# Patient Record
Sex: MALE | Race: WHITE | NOT HISPANIC OR LATINO | Employment: OTHER | URBAN - METROPOLITAN AREA
[De-identification: names, ages, dates, MRNs, and addresses within clinical notes are randomized per-mention and may not be internally consistent; named-entity substitution may affect disease eponyms.]

---

## 2019-09-13 ENCOUNTER — ANCILLARY PROCEDURE (OUTPATIENT)
Dept: ULTRASOUND IMAGING | Facility: CLINIC | Age: 69
End: 2019-09-13
Payer: MEDICARE

## 2019-09-13 DIAGNOSIS — E78.5 HYPERLIPIDEMIA, UNSPECIFIED: ICD-10-CM

## 2021-09-13 ENCOUNTER — LAB REQUISITION (OUTPATIENT)
Dept: LAB | Facility: CLINIC | Age: 71
End: 2021-09-13
Payer: MEDICARE

## 2021-09-13 DIAGNOSIS — Z00.00 ENCOUNTER FOR GENERAL ADULT MEDICAL EXAMINATION WITHOUT ABNORMAL FINDINGS: ICD-10-CM

## 2021-09-13 DIAGNOSIS — Z12.5 ENCOUNTER FOR SCREENING FOR MALIGNANT NEOPLASM OF PROSTATE: ICD-10-CM

## 2021-09-13 LAB
ALBUMIN SERPL-MCNC: 4 G/DL (ref 3.4–5)
ALP SERPL-CCNC: 47 U/L (ref 40–150)
ALT SERPL W P-5'-P-CCNC: 38 U/L (ref 0–70)
ANION GAP SERPL CALCULATED.3IONS-SCNC: 4 MMOL/L (ref 3–14)
AST SERPL W P-5'-P-CCNC: 33 U/L (ref 0–45)
BILIRUB SERPL-MCNC: 0.4 MG/DL (ref 0.2–1.3)
BUN SERPL-MCNC: 22 MG/DL (ref 7–30)
CALCIUM SERPL-MCNC: 9.2 MG/DL (ref 8.5–10.1)
CHLORIDE BLD-SCNC: 110 MMOL/L (ref 94–109)
CHOLEST SERPL-MCNC: 158 MG/DL
CO2 SERPL-SCNC: 28 MMOL/L (ref 20–32)
CREAT SERPL-MCNC: 1.36 MG/DL (ref 0.66–1.25)
FASTING STATUS PATIENT QL REPORTED: YES
GFR SERPL CREATININE-BSD FRML MDRD: 52 ML/MIN/1.73M2
GLUCOSE BLD-MCNC: 80 MG/DL (ref 70–99)
HDLC SERPL-MCNC: 56 MG/DL
LDLC SERPL CALC-MCNC: 82 MG/DL
NONHDLC SERPL-MCNC: 102 MG/DL
POTASSIUM BLD-SCNC: 4.2 MMOL/L (ref 3.4–5.3)
PROT SERPL-MCNC: 7.5 G/DL (ref 6.8–8.8)
PSA SERPL-MCNC: 0.07 UG/L (ref 0–4)
SODIUM SERPL-SCNC: 142 MMOL/L (ref 133–144)
TRIGL SERPL-MCNC: 102 MG/DL

## 2021-09-13 PROCEDURE — 80061 LIPID PANEL: CPT | Mod: ORL | Performed by: INTERNAL MEDICINE

## 2021-09-13 PROCEDURE — 80053 COMPREHEN METABOLIC PANEL: CPT | Mod: ORL | Performed by: INTERNAL MEDICINE

## 2021-09-13 PROCEDURE — G0103 PSA SCREENING: HCPCS | Mod: ORL | Performed by: INTERNAL MEDICINE

## 2022-02-28 ENCOUNTER — LAB REQUISITION (OUTPATIENT)
Dept: LAB | Facility: CLINIC | Age: 72
End: 2022-02-28
Payer: MEDICARE

## 2022-02-28 DIAGNOSIS — R79.89 OTHER SPECIFIED ABNORMAL FINDINGS OF BLOOD CHEMISTRY: ICD-10-CM

## 2022-02-28 LAB
ANION GAP SERPL CALCULATED.3IONS-SCNC: 5 MMOL/L (ref 3–14)
BUN SERPL-MCNC: 27 MG/DL (ref 7–30)
CALCIUM SERPL-MCNC: 9 MG/DL (ref 8.5–10.1)
CHLORIDE BLD-SCNC: 104 MMOL/L (ref 94–109)
CO2 SERPL-SCNC: 27 MMOL/L (ref 20–32)
CREAT SERPL-MCNC: 1.3 MG/DL (ref 0.66–1.25)
GFR SERPL CREATININE-BSD FRML MDRD: 58 ML/MIN/1.73M2
GLUCOSE BLD-MCNC: 77 MG/DL (ref 70–99)
POTASSIUM BLD-SCNC: 4.5 MMOL/L (ref 3.4–5.3)
SODIUM SERPL-SCNC: 136 MMOL/L (ref 133–144)

## 2022-02-28 PROCEDURE — 80048 BASIC METABOLIC PNL TOTAL CA: CPT | Mod: ORL | Performed by: INTERNAL MEDICINE

## 2022-07-25 ENCOUNTER — LAB REQUISITION (OUTPATIENT)
Dept: LAB | Facility: CLINIC | Age: 72
End: 2022-07-25
Payer: MEDICARE

## 2022-07-25 DIAGNOSIS — R79.89 OTHER SPECIFIED ABNORMAL FINDINGS OF BLOOD CHEMISTRY: ICD-10-CM

## 2022-07-25 DIAGNOSIS — Z12.5 ENCOUNTER FOR SCREENING FOR MALIGNANT NEOPLASM OF PROSTATE: ICD-10-CM

## 2022-07-25 DIAGNOSIS — Z13.1 ENCOUNTER FOR SCREENING FOR DIABETES MELLITUS: ICD-10-CM

## 2022-07-25 DIAGNOSIS — M81.0 AGE-RELATED OSTEOPOROSIS WITHOUT CURRENT PATHOLOGICAL FRACTURE: ICD-10-CM

## 2022-07-25 LAB
ANION GAP SERPL CALCULATED.3IONS-SCNC: 14 MMOL/L (ref 7–15)
BUN SERPL-MCNC: 24.9 MG/DL (ref 8–23)
CALCIUM SERPL-MCNC: 9.3 MG/DL (ref 8.8–10.2)
CHLORIDE SERPL-SCNC: 104 MMOL/L (ref 98–107)
CREAT SERPL-MCNC: 1.34 MG/DL (ref 0.67–1.17)
DEPRECATED HCO3 PLAS-SCNC: 22 MMOL/L (ref 22–29)
GFR SERPL CREATININE-BSD FRML MDRD: 56 ML/MIN/1.73M2
GLUCOSE SERPL-MCNC: 82 MG/DL (ref 70–99)
HBA1C MFR BLD: 6.1 %
POTASSIUM SERPL-SCNC: 4.1 MMOL/L (ref 3.4–5.3)
PSA SERPL-MCNC: 0.07 NG/ML (ref 0–6.5)
PTH-INTACT SERPL-MCNC: 32 PG/ML (ref 15–65)
SODIUM SERPL-SCNC: 140 MMOL/L (ref 136–145)

## 2022-07-25 PROCEDURE — 83036 HEMOGLOBIN GLYCOSYLATED A1C: CPT | Mod: ORL | Performed by: INTERNAL MEDICINE

## 2022-07-25 PROCEDURE — G0103 PSA SCREENING: HCPCS | Mod: ORL | Performed by: INTERNAL MEDICINE

## 2022-07-25 PROCEDURE — 82306 VITAMIN D 25 HYDROXY: CPT | Mod: ORL | Performed by: INTERNAL MEDICINE

## 2022-07-25 PROCEDURE — 84403 ASSAY OF TOTAL TESTOSTERONE: CPT | Mod: ORL | Performed by: INTERNAL MEDICINE

## 2022-07-25 PROCEDURE — 80048 BASIC METABOLIC PNL TOTAL CA: CPT | Mod: ORL | Performed by: INTERNAL MEDICINE

## 2022-07-25 PROCEDURE — 84270 ASSAY OF SEX HORMONE GLOBUL: CPT | Mod: ORL | Performed by: INTERNAL MEDICINE

## 2022-07-25 PROCEDURE — 83970 ASSAY OF PARATHORMONE: CPT | Mod: ORL | Performed by: INTERNAL MEDICINE

## 2022-07-26 LAB — SHBG SERPL-SCNC: 92 NMOL/L (ref 11–80)

## 2022-07-27 LAB
TESTOST FREE SERPL-MCNC: 7.13 NG/DL
TESTOST SERPL-MCNC: 693 NG/DL (ref 240–950)

## 2022-07-28 LAB
DEPRECATED CALCIDIOL+CALCIFEROL SERPL-MC: <44 UG/L (ref 20–75)
VITAMIN D2 SERPL-MCNC: <5 UG/L
VITAMIN D3 SERPL-MCNC: 39 UG/L

## 2022-09-26 ENCOUNTER — LAB REQUISITION (OUTPATIENT)
Dept: LAB | Facility: CLINIC | Age: 72
End: 2022-09-26
Payer: MEDICARE

## 2022-09-26 DIAGNOSIS — R73.03 PREDIABETES: ICD-10-CM

## 2022-09-26 LAB
ANION GAP SERPL CALCULATED.3IONS-SCNC: 15 MMOL/L (ref 7–15)
BUN SERPL-MCNC: 28.5 MG/DL (ref 8–23)
CALCIUM SERPL-MCNC: 9 MG/DL (ref 8.8–10.2)
CHLORIDE SERPL-SCNC: 106 MMOL/L (ref 98–107)
CREAT SERPL-MCNC: 1.47 MG/DL (ref 0.67–1.17)
DEPRECATED HCO3 PLAS-SCNC: 20 MMOL/L (ref 22–29)
GFR SERPL CREATININE-BSD FRML MDRD: 50 ML/MIN/1.73M2
GLUCOSE SERPL-MCNC: 83 MG/DL (ref 70–99)
POTASSIUM SERPL-SCNC: 4 MMOL/L (ref 3.4–5.3)
SODIUM SERPL-SCNC: 141 MMOL/L (ref 136–145)

## 2022-09-26 PROCEDURE — 80048 BASIC METABOLIC PNL TOTAL CA: CPT | Mod: ORL | Performed by: INTERNAL MEDICINE

## 2022-10-03 ENCOUNTER — MEDICAL CORRESPONDENCE (OUTPATIENT)
Dept: HEALTH INFORMATION MANAGEMENT | Facility: CLINIC | Age: 72
End: 2022-10-03

## 2022-10-06 ENCOUNTER — TELEPHONE (OUTPATIENT)
Dept: NEPHROLOGY | Facility: CLINIC | Age: 72
End: 2022-10-06

## 2022-10-06 DIAGNOSIS — E55.9 VITAMIN D DEFICIENCY, UNSPECIFIED: ICD-10-CM

## 2022-10-06 DIAGNOSIS — R79.89 ELEVATED SERUM CREATININE: Primary | ICD-10-CM

## 2022-10-06 NOTE — TELEPHONE ENCOUNTER
[facility] Call Center    Phone Message    May a detailed message be left on voicemail: yes     Reason for Call: Order(s): Other:   Reason for requested: Labs  Date needed: 10/31/22  Provider name: Dr. Powell      Action Taken: Message routed to:  [facility] ([facility] Neph    Travel Screening: Not Applicable

## 2022-10-07 ENCOUNTER — TRANSCRIBE ORDERS (OUTPATIENT)
Dept: OTHER | Age: 72
End: 2022-10-07

## 2022-10-07 DIAGNOSIS — R79.89 ELEVATED SERUM CREATININE: Primary | ICD-10-CM

## 2022-10-07 NOTE — TELEPHONE ENCOUNTER
DIAGNOSIS:   elevated creatinine   DATE RECEIVED: 10.31.2022   NOTES STATUS DETAILS   OFFICE NOTE from referring provider     OFFICE NOTE from other specialist  Care Everywhere 07.25.2022 Ray Holt MD  [facility]     *Only VASCULITIS or LUPUS gather office notes for the following     *PULMONARY       *ENT     *DERMATOLOGY     *RHEUMATOLOGY     DISCHARGE SUMMARY from hospital     DISCHARGE REPORT from the ER     MEDICATION LIST     IMAGING  (NEED IMAGES AND REPORTS)     KIDNEY CT SCAN     KIDNEY ULTRASOUND     MR ABDOMEN     NUCLEAR MEDICINE RENAL     LABS     CBC Care Everywhere 09.26.2022   CMP Care Everywhere 09.26.2022   BMP Care Everywhere 09.26.2022   UA     URINE PROTEIN     RENAL PANEL     BIOPSY     KIDNEY BIOPSY

## 2022-10-31 ENCOUNTER — LAB (OUTPATIENT)
Dept: LAB | Facility: CLINIC | Age: 72
End: 2022-10-31
Payer: MEDICARE

## 2022-10-31 ENCOUNTER — PRE VISIT (OUTPATIENT)
Dept: NEPHROLOGY | Facility: CLINIC | Age: 72
End: 2022-10-31

## 2022-10-31 ENCOUNTER — OFFICE VISIT (OUTPATIENT)
Dept: NEPHROLOGY | Facility: CLINIC | Age: 72
End: 2022-10-31
Attending: INTERNAL MEDICINE
Payer: MEDICARE

## 2022-10-31 VITALS
WEIGHT: 145.1 LBS | DIASTOLIC BLOOD PRESSURE: 72 MMHG | SYSTOLIC BLOOD PRESSURE: 118 MMHG | HEART RATE: 48 BPM | OXYGEN SATURATION: 100 % | TEMPERATURE: 97.3 F

## 2022-10-31 DIAGNOSIS — R79.89 ELEVATED SERUM CREATININE: ICD-10-CM

## 2022-10-31 DIAGNOSIS — E55.9 VITAMIN D DEFICIENCY, UNSPECIFIED: ICD-10-CM

## 2022-10-31 DIAGNOSIS — N28.1 KIDNEY CYSTS: Primary | ICD-10-CM

## 2022-10-31 LAB
ALBUMIN MFR UR ELPH: 7.2 MG/DL
ALBUMIN SERPL BCG-MCNC: 4.3 G/DL (ref 3.5–5.2)
ALBUMIN UR-MCNC: NEGATIVE MG/DL
ANION GAP SERPL CALCULATED.3IONS-SCNC: 10 MMOL/L (ref 7–15)
APPEARANCE UR: CLEAR
BILIRUB UR QL STRIP: NEGATIVE
BUN SERPL-MCNC: 28.7 MG/DL (ref 8–23)
CALCIUM SERPL-MCNC: 9.4 MG/DL (ref 8.8–10.2)
CHLORIDE SERPL-SCNC: 107 MMOL/L (ref 98–107)
COLOR UR AUTO: YELLOW
CREAT SERPL-MCNC: 1.56 MG/DL (ref 0.67–1.17)
CREAT UR-MCNC: 73.4 MG/DL
CYSTATIN C (ROCHE): 0.9 MG/L (ref 0.6–1)
DEPRECATED CALCIDIOL+CALCIFEROL SERPL-MC: 32 UG/L (ref 20–75)
DEPRECATED HCO3 PLAS-SCNC: 24 MMOL/L (ref 22–29)
ERYTHROCYTE [DISTWIDTH] IN BLOOD BY AUTOMATED COUNT: 13.2 % (ref 10–15)
GFR SERPL CREATININE-BSD FRML MDRD: 47 ML/MIN/1.73M2
GFR SERPL CREATININE-BSD FRML MDRD: 85 ML/MIN/1.73M2
GLUCOSE SERPL-MCNC: 91 MG/DL (ref 70–99)
GLUCOSE UR STRIP-MCNC: NEGATIVE MG/DL
HCT VFR BLD AUTO: 41.6 % (ref 40–53)
HGB BLD-MCNC: 14.1 G/DL (ref 13.3–17.7)
HGB UR QL STRIP: NEGATIVE
KETONES UR STRIP-MCNC: NEGATIVE MG/DL
LEUKOCYTE ESTERASE UR QL STRIP: NEGATIVE
MCH RBC QN AUTO: 32 PG (ref 26.5–33)
MCHC RBC AUTO-ENTMCNC: 33.9 G/DL (ref 31.5–36.5)
MCV RBC AUTO: 95 FL (ref 78–100)
NITRATE UR QL: NEGATIVE
PH UR STRIP: 6.5 [PH] (ref 5–7)
PHOSPHATE SERPL-MCNC: 2.9 MG/DL (ref 2.5–4.5)
PLATELET # BLD AUTO: 145 10E3/UL (ref 150–450)
POTASSIUM SERPL-SCNC: 4 MMOL/L (ref 3.4–5.3)
PROT/CREAT 24H UR: 0.1 MG/MG CR (ref 0–0.2)
PTH-INTACT SERPL-MCNC: 39 PG/ML (ref 15–65)
RBC # BLD AUTO: 4.4 10E6/UL (ref 4.4–5.9)
RBC URINE: <1 /HPF
SODIUM SERPL-SCNC: 141 MMOL/L (ref 136–145)
SP GR UR STRIP: 1.01 (ref 1–1.03)
UROBILINOGEN UR STRIP-MCNC: NORMAL MG/DL
WBC # BLD AUTO: 2.9 10E3/UL (ref 4–11)
WBC URINE: 0 /HPF

## 2022-10-31 PROCEDURE — 36415 COLL VENOUS BLD VENIPUNCTURE: CPT | Performed by: PATHOLOGY

## 2022-10-31 PROCEDURE — 81001 URINALYSIS AUTO W/SCOPE: CPT | Performed by: PATHOLOGY

## 2022-10-31 PROCEDURE — 80069 RENAL FUNCTION PANEL: CPT | Performed by: PATHOLOGY

## 2022-10-31 PROCEDURE — 85027 COMPLETE CBC AUTOMATED: CPT | Performed by: PATHOLOGY

## 2022-10-31 PROCEDURE — 83970 ASSAY OF PARATHORMONE: CPT | Performed by: PATHOLOGY

## 2022-10-31 PROCEDURE — G0463 HOSPITAL OUTPT CLINIC VISIT: HCPCS

## 2022-10-31 PROCEDURE — 82306 VITAMIN D 25 HYDROXY: CPT | Performed by: INTERNAL MEDICINE

## 2022-10-31 PROCEDURE — 99417 PROLNG OP E/M EACH 15 MIN: CPT | Performed by: INTERNAL MEDICINE

## 2022-10-31 PROCEDURE — 82610 CYSTATIN C: CPT | Performed by: INTERNAL MEDICINE

## 2022-10-31 PROCEDURE — 99205 OFFICE O/P NEW HI 60 MIN: CPT | Performed by: INTERNAL MEDICINE

## 2022-10-31 PROCEDURE — 84156 ASSAY OF PROTEIN URINE: CPT | Performed by: INTERNAL MEDICINE

## 2022-10-31 ASSESSMENT — PAIN SCALES - GENERAL: PAINLEVEL: NO PAIN (0)

## 2022-10-31 NOTE — NURSING NOTE
Chief Complaint   Patient presents with     New Patient       /72   Pulse (!) 48   Temp 97.3  F (36.3  C) (Oral)   Wt 65.8 kg (145 lb 1.6 oz)   SpO2 100%     Chaitanya Gambino on 10/31/2022 at 8:43 AM

## 2022-10-31 NOTE — PROGRESS NOTES
"  Nephrology Initial Consult  October 30, 2022      Phu Jackson MRN:[id number] YOB: 1950  Primary care provider: Ray Holt  Requesting physician: Self-referred    REASON FOR CONSULT: Concerned about kidney health, elevated Creatinine    HISTORY OF PRESENT ILLNESS:  Phu Jackson is a 72 year old with medical history of right kidney cysts, arthritis, depression who is here for concerning of elevated creatinine.    He know that his eGFr was below 60 since about 12 years ago. He then was told to drink more fluid.  Since then eGFR have been fluctuated but the were in 60s. In 2014, he saw Dr. Ray Wilson at [facility] in [place]. He had a kidney ultrasound which showed rt kidney cyst. At that time, it was thought that his Cr elevation was due to his high muscle mass/high protein and also NSAIDs use. He was noted to use NSAIDs daily for about 20 years. Then, his BP was 100/64 and his BMI was 21.2. Cr was noted between 1.2-1.3.  His creatinine in 9/13/2021 was 1.36 and improved to 1.30 on 2/20 8/8/2022 and was 1.34 on 7/25/2022.  However, creatinine started to increase to 1.47 on 9/26/2022 and the most recent 1 is 1.56 on 10/31/2022 today.    He is a very active fozia. He used to run 6 miles per day almost daily until 2018 when he was found to have arthritis in his back. Current, he does 200 push ups daily, walking 3.5 miles 2/wk, swimming 1.25 miles/wk and weight lifting 25 min followed by elliptical 20 minute. He used to drink but stopped completely in 2002. He last smoked cigarette back 50 years ago. He drinks 3 shots of lattes X 3 times/day. He drinks 4-6 can diet rootbeers/day. He ingested about 120 grams of protein/day mainly through milk and other food.    Today, he feels worried about his Cr. He has no leg swelling. He dropped weight in 2016 due to mental issue but better now. He has no DM, high cholesterol or HTN. He has \"weak\" bladder but mo other problem with urination. " He had cyst in his right kidney that was found in 2014 but no follow-up.  He ingested protein in the form of milk, 3 X 16 Oz lattes with 3 shots each. He eats about 120 g of protein per day. He has no family Hx of kidney disease. He drinks water about 27-28 Oz per day. He felled on his hip in summer 2022 and started to take Fosamax since early September 2022. He went to [facility] sport clinic and had bone density scan test which showed that he had osteopenia. He was an . He lives with his wife. He has a child from previous marriage and she lives in [place].     PAST MEDICAL HISTORY:  Reviewed with patient on 10/31/2022     No past medical history on file.    No past surgical history on file.     MEDICATIONS:  [facility] Meds  Current Outpatient Medications   Medication     alendronate (FOSAMAX) 70 MG tablet     atorvastatin (LIPITOR) 20 MG tablet     escitalopram (LEXAPRO) 20 MG tablet     finasteride (PROSCAR) 5 MG tablet     No current facility-administered medications for this visit.     ALLERGIES:    No Known Allergies    REVIEW OF SYSTEMS:  A comprehensive of systems was negative except as noted above.    SOCIAL HISTORY:   Social History     Socioeconomic History     Marital status:      Spouse name: Not on file     Number of children: Not on file     Years of education: Not on file     Highest education level: Not on file   Occupational History     Not on file   Tobacco Use     Smoking status: Former     Types: Cigarettes     Smokeless tobacco: Not on file   Substance and Sexual Activity     Alcohol use: Not on file     Drug use: Not on file     Sexual activity: Not on file   Other Topics Concern     Not on file   Social History Narrative     Not on file     Social Determinants of Health     Financial Resource Strain: Not on file   Food Insecurity: Not on file   Transportation Needs: Not on file   Physical Activity: Not on file   Stress: Not on file   Social Connections: Not on file   Intimate Partner  Violence: Not on file   Housing Stability: Not on file     Reviewed with patient.  Used to smoke cigar but stopped about 20 years ago.    FAMILY MEDICAL HISTORY:   No family history of kidney disease.  Reviewed with patient.    PHYSICAL EXAM:   /72   Pulse (!) 48   Temp 97.3  F (36.3  C) (Oral)   Wt 65.8 kg (145 lb 1.6 oz)   SpO2 100%       GENERAL APPEARANCE: No distress, awake, anxious  EYES: No scleral icterus, pupils equal  Endo: No goiter, no moon facies  Lymphatics: no cervical or supraclavicular LAD  Pulmonary: lungs clear to auscultation with equal breath sounds bilaterally, no clubbing  CV: regular rhythm, normal rate, no rub   - Edema: none  GI: soft, nontender, normal bowel sounds  MS: no evidence of inflammation in joints, no muscle tenderness  : NO CVA tenderness  SKIN: no rash, warm, dry, no cyanosis  NEURO: face symmetric, no asterixis     LABS:   Last Renal Panel:  Sodium   Date Value Ref Range Status   10/31/2022 141 136 - 145 mmol/L Final     Potassium   Date Value Ref Range Status   10/31/2022 4.0 3.4 - 5.3 mmol/L Final   02/28/2022 4.5 3.4 - 5.3 mmol/L Final     Chloride   Date Value Ref Range Status   10/31/2022 107 98 - 107 mmol/L Final   02/28/2022 104 94 - 109 mmol/L Final     Carbon Dioxide (CO2)   Date Value Ref Range Status   10/31/2022 24 22 - 29 mmol/L Final   02/28/2022 27 20 - 32 mmol/L Final     Anion Gap   Date Value Ref Range Status   10/31/2022 10 7 - 15 mmol/L Final   02/28/2022 5 3 - 14 mmol/L Final     Glucose   Date Value Ref Range Status   10/31/2022 91 70 - 99 mg/dL Final   02/28/2022 77 70 - 99 mg/dL Final     Urea Nitrogen   Date Value Ref Range Status   10/31/2022 28.7 (H) 8.0 - 23.0 mg/dL Final   02/28/2022 27 7 - 30 mg/dL Final     Creatinine   Date Value Ref Range Status   10/31/2022 1.56 (H) 0.67 - 1.17 mg/dL Final     GFR Estimate   Date Value Ref Range Status   10/31/2022 47 (L) >60 mL/min/1.73m2 Final     Comment:     Effective December 21, 2021 eGFRcr  in adults is calculated using the 2021 CKD-EPI creatinine equation which includes age and gender (Juan M et al., [facility], DOI: 10.1056/GGWQso9492756)     Calcium   Date Value Ref Range Status   10/31/2022 9.4 8.8 - 10.2 mg/dL Final     Phosphorus   Date Value Ref Range Status   10/31/2022 2.9 2.5 - 4.5 mg/dL Final     Albumin   Date Value Ref Range Status   10/31/2022 4.3 3.5 - 5.2 g/dL Final   09/13/2021 4.0 3.4 - 5.0 g/dL Final       URINE STUDIES  Recent Labs   Lab Test 10/31/22  0827   COLOR Yellow   APPEARANCE Clear   URINEGLC Negative   URINEBILI Negative   URINEKETONE Negative   SG 1.015   UBLD Negative   URINEPH 6.5   PROTEIN Negative   NITRITE Negative   LEUKEST Negative   RBCU <1   WBCU 0     No lab results found.  PTH  Recent Labs   Lab Test 10/31/22  0821 07/25/22  1030   PTHI 39 32     IRON STUDIES  No lab results found.    IMAGING:  No recent kidney imaging to review.    ASSESSMENT AND RECOMMENDATIONS:   # Elevated creatinine: CKD versus high muscle mass resulting in higher than normal Cr  # Hx of heavy NSAIDs use 20 years duration stopped since 2013  The patient has been noted to have creatinine elevation at least 12 years ago.  However, his creatinine has been stabilized and fluctuated between 1.2-1.3 for years.  Recently, his creatinine started to increase to 1.47 on 9/26/2022 and further increased to 1.56 today with EGFR 47.  Previously, there was a thought that the patient has creatinine elevation due to high muscle mass and high-protein diet.  However no Cystatin C or other measure GFR was being done.  It is also possible to chronic NSAID use may impact his kidney function and he may have low-grade chronic kidney disease all along.  However due to recent increase in creatinine is of interest given his eGFR was 56 in 7/22 but now 47 wit Cr 1.56.  The patient was recently started on Fosamax for osteopenia in early September and since then creatinine started to increase.  His UA is essentially bland  without protein or blood. UPCR is pending.  I discussed with him at length about possibility of why his creatinine is elevated in the past and possible reason why his creatinine elevated recently.  At first step we will check Cystatin C which I will add onto the blood test today to ensure that we have accurate measurement of kidney function.    In the meantime, I discussed ways to delay progression of chronic kidney disease including, increasing water intake from 28 ounces a day to 60 to 70 ounces a day, completely stay away from NSAID and consider stopping Fosamax and switch to a larger agent that is not nephrotoxic.  I also discussed about reducing the amount of protein intake to about 1 g/kg/day from approximately 2 g/kg/day that he is taking right now.  - I will message his primary care doctor, Dr. Holt for my recommendation about stopping Fosamax  - Consider increasing water intake and reduce the amount protein intake  - Check cystatin C today  - Consider checking BMP in 1 month to monitor Cr trend  - Follow-up in 3 months with labs  # Right kidney cyst  First noted in 2014.  We will repeat a kidney ultrasound at this time.    Follow-up in 90 minutes.     I spent 90 minutes on the date of the encounter doing chart review, history and exam, documentation and further activities as noted above. 50 minutes of this visit is dedicated to direct patient interaction via face to face.     Jaye Powell MD on 10/31/2022

## 2022-10-31 NOTE — PATIENT INSTRUCTIONS
We are waiting for cystatin C  2.  We will stop Fosamax  3.  Drink more fluid 60-70 Oz  4. US kidneys

## 2022-10-31 NOTE — LETTER
10/31/2022       RE: Phu Jackson  [address]  [place] [address]     Dear Colleague,    Thank you for referring your patient, Phu Jackson, to the [facility]. Please see a copy of my visit note below.      Nephrology Initial Consult  October 30, 2022      Phu Jackson MRN:[id number] YOB: 1950  Primary care provider: Ray Holt  Requesting physician: Self-referred    REASON FOR CONSULT: Concerned about kidney health, elevated Creatinine    HISTORY OF PRESENT ILLNESS:  Phu Jackson is a 72 year old with medical history of right kidney cysts, arthritis, depression who is here for concerning of elevated creatinine.    He know that his eGFr was below 60 since about 12 years ago. He then was told to drink more fluid.  Since then eGFR have been fluctuated but the were in 60s. In 2014, he saw Dr. Ray Wilson at [facility] in [place]. He had a kidney ultrasound which showed rt kidney cyst. At that time, it was thought that his Cr elevation was due to his high muscle mass/high protein and also NSAIDs use. He was noted to use NSAIDs daily for about 20 years. Then, his BP was 100/64 and his BMI was 21.2. Cr was noted between 1.2-1.3.  His creatinine in 9/13/2021 was 1.36 and improved to 1.30 on 2/20 8/8/2022 and was 1.34 on 7/25/2022.  However, creatinine started to increase to 1.47 on 9/26/2022 and the most recent 1 is 1.56 on 10/31/2022 today.    He is a very active fozia. He used to run 6 miles per day almost daily until 2018 when he was found to have arthritis in his back. Current, he does 200 push ups daily, walking 3.5 miles 2/wk, swimming 1.25 miles/wk and weight lifting 25 min followed by elliptical 20 minute. He used to drink but stopped completely in 2002. He last smoked cigarette back 50 years ago. He drinks 3 shots of lattes X 3 times/day. He drinks 4-6 can diet  "rootbeers/day. He ingested about 120 grams of protein/day mainly through milk and other food.    Today, he feels worried about his Cr. He has no leg swelling. He dropped weight in 2016 due to mental issue but better now. He has no DM, high cholesterol or HTN. He has \"weak\" bladder but mo other problem with urination. He had cyst in his right kidney that was found in 2014 but no follow-up.  He ingested protein in the form of milk, 3 X 16 Oz lattes with 3 shots each. He eats about 120 g of protein per day. He has no family Hx of kidney disease. He drinks water about 27-28 Oz per day. He felled on his hip in summer 2022 and started to take Fosamax since early September 2022. He went to [facility] sport clinic and had bone density scan test which showed that he had osteopenia. He was an . He lives with his wife. He has a child from previous marriage and she lives in [place].     PAST MEDICAL HISTORY:  Reviewed with patient on 10/31/2022     No past medical history on file.    No past surgical history on file.     MEDICATIONS:  \[facility]\"" Meds  Current Outpatient Medications   Medication     alendronate (FOSAMAX) 70 MG tablet     atorvastatin (LIPITOR) 20 MG tablet     escitalopram (LEXAPRO) 20 MG tablet     finasteride (PROSCAR) 5 MG tablet     No current facility-administered medications for this visit.     ALLERGIES:    No Known Allergies    REVIEW OF SYSTEMS:  A comprehensive of systems was negative except as noted above.    SOCIAL HISTORY:   Social History     Socioeconomic History     Marital status:      Spouse name: Not on file     Number of children: Not on file     Years of education: Not on file     Highest education level: Not on file   Occupational History     Not on file   Tobacco Use     Smoking status: Former     Types: Cigarettes     Smokeless tobacco: Not on file   Substance and Sexual Activity     Alcohol use: Not on file     Drug use: Not on file     Sexual activity: Not on file   Other Topics " Concern     Not on file   Social History Narrative     Not on file     Social Determinants of Health     Financial Resource Strain: Not on file   Food Insecurity: Not on file   Transportation Needs: Not on file   Physical Activity: Not on file   Stress: Not on file   Social Connections: Not on file   Intimate Partner Violence: Not on file   Housing Stability: Not on file     Reviewed with patient.  Used to smoke cigar but stopped about 20 years ago.    FAMILY MEDICAL HISTORY:   No family history of kidney disease.  Reviewed with patient.    PHYSICAL EXAM:   /72   Pulse (!) 48   Temp 97.3  F (36.3  C) (Oral)   Wt 65.8 kg (145 lb 1.6 oz)   SpO2 100%       GENERAL APPEARANCE: No distress, awake, anxious  EYES: No scleral icterus, pupils equal  Endo: No goiter, no moon facies  Lymphatics: no cervical or supraclavicular LAD  Pulmonary: lungs clear to auscultation with equal breath sounds bilaterally, no clubbing  CV: regular rhythm, normal rate, no rub   - Edema: none  GI: soft, nontender, normal bowel sounds  MS: no evidence of inflammation in joints, no muscle tenderness  : NO CVA tenderness  SKIN: no rash, warm, dry, no cyanosis  NEURO: face symmetric, no asterixis     LABS:   Last Renal Panel:  Sodium   Date Value Ref Range Status   10/31/2022 141 136 - 145 mmol/L Final     Potassium   Date Value Ref Range Status   10/31/2022 4.0 3.4 - 5.3 mmol/L Final   02/28/2022 4.5 3.4 - 5.3 mmol/L Final     Chloride   Date Value Ref Range Status   10/31/2022 107 98 - 107 mmol/L Final   02/28/2022 104 94 - 109 mmol/L Final     Carbon Dioxide (CO2)   Date Value Ref Range Status   10/31/2022 24 22 - 29 mmol/L Final   02/28/2022 27 20 - 32 mmol/L Final     Anion Gap   Date Value Ref Range Status   10/31/2022 10 7 - 15 mmol/L Final   02/28/2022 5 3 - 14 mmol/L Final     Glucose   Date Value Ref Range Status   10/31/2022 91 70 - 99 mg/dL Final   02/28/2022 77 70 - 99 mg/dL Final     Urea Nitrogen   Date Value Ref Range  Status   10/31/2022 28.7 (H) 8.0 - 23.0 mg/dL Final   02/28/2022 27 7 - 30 mg/dL Final     Creatinine   Date Value Ref Range Status   10/31/2022 1.56 (H) 0.67 - 1.17 mg/dL Final     GFR Estimate   Date Value Ref Range Status   10/31/2022 47 (L) >60 mL/min/1.73m2 Final     Comment:     Effective December 21, 2021 eGFRcr in adults is calculated using the 2021 CKD-EPI creatinine equation which includes age and gender (Juan M et al., [place], DOI: 10.1056/DBFEdn8779764)     Calcium   Date Value Ref Range Status   10/31/2022 9.4 8.8 - 10.2 mg/dL Final     Phosphorus   Date Value Ref Range Status   10/31/2022 2.9 2.5 - 4.5 mg/dL Final     Albumin   Date Value Ref Range Status   10/31/2022 4.3 3.5 - 5.2 g/dL Final   09/13/2021 4.0 3.4 - 5.0 g/dL Final       URINE STUDIES  Recent Labs   Lab Test 10/31/22  0827   COLOR Yellow   APPEARANCE Clear   URINEGLC Negative   URINEBILI Negative   URINEKETONE Negative   SG 1.015   UBLD Negative   URINEPH 6.5   PROTEIN Negative   NITRITE Negative   LEUKEST Negative   RBCU <1   WBCU 0     No lab results found.  PTH  Recent Labs   Lab Test 10/31/22  0821 07/25/22  1030   PTHI 39 32     IRON STUDIES  No lab results found.    IMAGING:  No recent kidney imaging to review.    ASSESSMENT AND RECOMMENDATIONS:   # Elevated creatinine: CKD versus high muscle mass resulting in higher than normal Cr  # Hx of heavy NSAIDs use 20 years duration stopped since 2013  The patient has been noted to have creatinine elevation at least 12 years ago.  However, his creatinine has been stabilized and fluctuated between 1.2-1.3 for years.  Recently, his creatinine started to increase to 1.47 on 9/26/2022 and further increased to 1.56 today with EGFR 47.  Previously, there was a thought that the patient has creatinine elevation due to high muscle mass and high-protein diet.  However no Cystatin C or other measure GFR was being done.  It is also possible to chronic NSAID use may impact his kidney function and he may  have low-grade chronic kidney disease all along.  However due to recent increase in creatinine is of interest given his eGFR was 56 in 7/22 but now 47 wit Cr 1.56.  The patient was recently started on Fosamax for osteopenia in early September and since then creatinine started to increase.  His UA is essentially bland without protein or blood. UPCR is pending.  I discussed with him at length about possibility of why his creatinine is elevated in the past and possible reason why his creatinine elevated recently.  At first step we will check Cystatin C which I will add onto the blood test today to ensure that we have accurate measurement of kidney function.    In the meantime, I discussed ways to delay progression of chronic kidney disease including, increasing water intake from 28 ounces a day to 60 to 70 ounces a day, completely stay away from NSAID and consider stopping Fosamax and switch to a larger agent that is not nephrotoxic.  I also discussed about reducing the amount of protein intake to about 1 g/kg/day from approximately 2 g/kg/day that he is taking right now.  - I will message his primary care doctor, Dr. Holt for my recommendation about stopping Fosamax  - Consider increasing water intake and reduce the amount protein intake  - Check cystatin C today  - Consider checking BMP in 1 month to monitor Cr trend  - Follow-up in 3 months with labs  # Right kidney cyst  First noted in 2014.  We will repeat a kidney ultrasound at this time.    Follow-up in 90 minutes.     I spent 90 minutes on the date of the encounter doing chart review, history and exam, documentation and further activities as noted above. 50 minutes of this visit is dedicated to direct patient interaction via face to face.     Jaye Powell MD on 10/31/2022

## 2022-11-01 ENCOUNTER — ANCILLARY PROCEDURE (OUTPATIENT)
Dept: ULTRASOUND IMAGING | Facility: CLINIC | Age: 72
End: 2022-11-01
Attending: INTERNAL MEDICINE
Payer: MEDICARE

## 2022-11-01 DIAGNOSIS — N28.1 KIDNEY CYSTS: ICD-10-CM

## 2022-11-01 PROCEDURE — 76770 US EXAM ABDO BACK WALL COMP: CPT | Performed by: RADIOLOGY

## 2022-11-02 ENCOUNTER — MEDICAL CORRESPONDENCE (OUTPATIENT)
Dept: HEALTH INFORMATION MANAGEMENT | Facility: CLINIC | Age: 72
End: 2022-11-02

## 2022-11-04 ENCOUNTER — TRANSCRIBE ORDERS (OUTPATIENT)
Dept: OTHER | Age: 72
End: 2022-11-04

## 2022-11-04 ENCOUNTER — PATIENT OUTREACH (OUTPATIENT)
Dept: ONCOLOGY | Facility: CLINIC | Age: 72
End: 2022-11-04

## 2022-11-04 DIAGNOSIS — D72.810 LYMPHOPENIA: Primary | ICD-10-CM

## 2022-11-04 NOTE — PROGRESS NOTES
"November 4, 2022    Hematology/Oncology  referral reviewed.       Pertinent labs (recent CBC from nephrology, no recent diff) -- BOOKMARKED    Referring provider visit note -- BOOKMARKED    Per 9/26 note with referring MD: \"Leukopenia: He has been previously evaluated by hematology and found to have a low neutrophil count as well as thrombocytopenia. We have been trending his white blood cell count and the last 2 times its been decreasing with a slightly lower neutrophil count today. We will recheck again in 2 months and at that point if his neutrophil count or white count is lower still I will refer him to hematology.\"    IB message to referring MD requesting clarification. Are they planning to repeat CBC w/ diff or does he want referral to Hematology at this time? Will follow-up pending input from referring MD.    Hematology intake scheduling team (NPS phone number [phone] Monday - Friday 8am - 4:30 pm) will contact pt in the next 1-2 business days to schedule the consultation.    Leidy Trevizo, BSN, RN, PHN, OCN  Hematology/Oncology Nurse Navigator  [facility]  [phone]              " 61 year old male with PMH HTN, dyslipidemia, ESRD on HD, DM, CAD s/p CABG, CKD3 presented with dyspnea, fever and chills.   T101.2, WBC 12.6, Lact 2.2, SCr 1.98, INR 2.63 at admission      Enterococcus fecalis bacteremia. TTE without any vegetations CT abdomen with diarrhea in sigmoids as well as gastric thickening (which corresponds with patients history of GERD). Repeat culture  results negative. PICC placed 6/29/19.  - Vancomycin changed to Ampicillin as sensitive      Multifocal pneumonia with associated sepsis present upon admission. Now afebrile, normoxic.  CT chest with bilateral infiltrates RUL, RML and BRETT. Sepsis and pneumonia resolved.  - Continue antibiotics, change to Enterococcal coverage after ABELINO.        CKD4, with MENDY (likely ATN), likely secondary to sepsis, diuretic, diarrhea  - Avoid  nephrotoxin; Lasix discontinued by Nephrology earlier   - Monitor SCr    Acute gouty Arthritis - Right knee, improved  - Prednisone 40 mg PO daily    AF, rate controlled, INR therapeutic  - Continue BB  - Coumadin tonight    DMT2, A1c 9.1. Hyperglycemia uncontrolled worsening increase with Prednisone for gout  - Continue BGM and Sliding scale Insulins (increased to 3U multiples)  - Lantus - increased to 25 from 18 Units  - Premeal Insulins increased to7 from 5 Units  - Endocrinology consult (Add Lantus 10U qAM)    Diarrhea, likely antibiotic associated - resolved  - monitor BM, Lytes  - Continue Probiotics      GERD  - Continue PPI      Dyslipidemia  - Continue Statin    Prophylactic measure  - INR therapeutic for VTEP  - Florastor for C. diff, discontinue now that has PICC    Disposition -  Anticipate back to [facility] in next 24 hours

## 2022-11-09 ENCOUNTER — ONCOLOGY VISIT (OUTPATIENT)
Dept: ONCOLOGY | Facility: HOSPITAL | Age: 72
End: 2022-11-09
Attending: INTERNAL MEDICINE
Payer: MEDICARE

## 2022-11-09 ENCOUNTER — LAB (OUTPATIENT)
Dept: INFUSION THERAPY | Facility: HOSPITAL | Age: 72
End: 2022-11-09
Attending: INTERNAL MEDICINE
Payer: MEDICARE

## 2022-11-09 VITALS
BODY MASS INDEX: 20.64 KG/M2 | RESPIRATION RATE: 14 BRPM | WEIGHT: 144.2 LBS | HEIGHT: 70 IN | OXYGEN SATURATION: 99 % | TEMPERATURE: 97.7 F | DIASTOLIC BLOOD PRESSURE: 64 MMHG | SYSTOLIC BLOOD PRESSURE: 128 MMHG | HEART RATE: 54 BPM

## 2022-11-09 DIAGNOSIS — D72.819 LEUKOPENIA, UNSPECIFIED TYPE: ICD-10-CM

## 2022-11-09 DIAGNOSIS — D69.6 THROMBOCYTOPENIA (H): ICD-10-CM

## 2022-11-09 DIAGNOSIS — D69.6 THROMBOCYTOPENIA (H): Primary | ICD-10-CM

## 2022-11-09 DIAGNOSIS — D70.9 NEUTROPENIA, UNSPECIFIED TYPE (H): ICD-10-CM

## 2022-11-09 DIAGNOSIS — D70.9 NEUTROPENIA, UNSPECIFIED TYPE (H): Primary | ICD-10-CM

## 2022-11-09 DIAGNOSIS — D72.810 LYMPHOPENIA: ICD-10-CM

## 2022-11-09 LAB
BASOPHILS # BLD AUTO: 0 10E3/UL (ref 0–0.2)
BASOPHILS NFR BLD AUTO: 1 %
EOSINOPHIL # BLD AUTO: 0 10E3/UL (ref 0–0.7)
EOSINOPHIL NFR BLD AUTO: 1 %
ERYTHROCYTE [DISTWIDTH] IN BLOOD BY AUTOMATED COUNT: 12.9 % (ref 10–15)
HCT VFR BLD AUTO: 40.9 % (ref 40–53)
HGB BLD-MCNC: 14 G/DL (ref 13.3–17.7)
HOLD SPECIMEN: NORMAL
IMM GRANULOCYTES # BLD: 0 10E3/UL
IMM GRANULOCYTES NFR BLD: 0 %
LDH SERPL L TO P-CCNC: 169 U/L (ref 0–250)
LYMPHOCYTES # BLD AUTO: 1.1 10E3/UL (ref 0.8–5.3)
LYMPHOCYTES NFR BLD AUTO: 28 %
MCH RBC QN AUTO: 32.3 PG (ref 26.5–33)
MCHC RBC AUTO-ENTMCNC: 34.2 G/DL (ref 31.5–36.5)
MCV RBC AUTO: 95 FL (ref 78–100)
MONOCYTES # BLD AUTO: 0.3 10E3/UL (ref 0–1.3)
MONOCYTES NFR BLD AUTO: 9 %
NEUTROPHILS # BLD AUTO: 2.4 10E3/UL (ref 1.6–8.3)
NEUTROPHILS NFR BLD AUTO: 61 %
NRBC # BLD AUTO: 0 10E3/UL
NRBC BLD AUTO-RTO: 0 /100
PLATELET # BLD AUTO: 142 10E3/UL (ref 150–450)
PLATELETS.RETICULATED NFR BLD AUTO: 9.2 % (ref 1–7)
RBC # BLD AUTO: 4.33 10E6/UL (ref 4.4–5.9)
RETICS # AUTO: 0.04 10E6/UL (ref 0.01–0.11)
RETICS/RBC NFR AUTO: 1 % (ref 0.8–2.7)
VIT B12 SERPL-MCNC: 406 PG/ML (ref 232–1245)
WBC # BLD AUTO: 3.9 10E3/UL (ref 4–11)

## 2022-11-09 PROCEDURE — G0463 HOSPITAL OUTPT CLINIC VISIT: HCPCS

## 2022-11-09 PROCEDURE — 85045 AUTOMATED RETICULOCYTE COUNT: CPT

## 2022-11-09 PROCEDURE — 82607 VITAMIN B-12: CPT

## 2022-11-09 PROCEDURE — 83615 LACTATE (LD) (LDH) ENZYME: CPT

## 2022-11-09 PROCEDURE — 36415 COLL VENOUS BLD VENIPUNCTURE: CPT

## 2022-11-09 PROCEDURE — 99203 OFFICE O/P NEW LOW 30 MIN: CPT | Performed by: INTERNAL MEDICINE

## 2022-11-09 PROCEDURE — 85025 COMPLETE CBC W/AUTO DIFF WBC: CPT

## 2022-11-09 PROCEDURE — 85055 RETICULATED PLATELET ASSAY: CPT

## 2022-11-09 RX ORDER — TAMSULOSIN HYDROCHLORIDE 0.4 MG/1
1 CAPSULE ORAL DAILY
COMMUNITY
Start: 2022-04-19

## 2022-11-09 RX ORDER — ATROPINE SULFATE 10 MG/ML
SOLUTION/ DROPS OPHTHALMIC
COMMUNITY
Start: 2022-08-16

## 2022-11-09 ASSESSMENT — PAIN SCALES - GENERAL: PAINLEVEL: NO PAIN (0)

## 2022-11-09 NOTE — PROGRESS NOTES
[facility] Hematology and Oncology Consult Note    Patient: Phu Jackson  MRN: [id number]  Date of Service: 11/09/2022      Reason for Visit    Chief Complaint   Patient presents with     Hematology     New patient consult related to Lymphopenia         Assessment/Plan    Problem List Items Addressed This Visit    None  Visit Diagnoses     Thrombocytopenia (H)    -  Primary    Lymphopenia        Leukopenia, unspecified type            Chronic leukopenia and cytopenia  I reviewed his chart in detail today.  He has history of chronic intermittent leukopenia and mild thrombocytopenia.  Etiology is not clear but I suspect this probably due to an autoimmune process.  I repeated his labs today.  His neutrophil count is normal.  Total white count trend is to be slightly low at 3.9.  Lymphocyte count is also normal.  Platelet count is 140 K.  Has elevated IPF which suggest good marrow function and evidence of peripheral platelet destruction.  The fact that he has not had any issues with recurrent infections or other signs symptoms of severe neutropenia, it is reassuring that he has probably adequate marrow function and is able to meet the demands if needed.    I do not think there is any major concern for underlying bone marrow pathology like MDS or other myeloid malignancies.  His hemoglobin is normal and MCV is normal.  LDH is also normal.  I have ordered B12 testing just in case and it is pending.  Other potential causes could be drug-induced thrombocytopenia and leukopenia.  Fosamax was recently discontinued.  He does not look like he is on any medications that would cause significant cytopenias.  Denies taking any over-the-counter NSAIDs in the recent past.    Overall I am not overly concerned about his mild cytopenias.  I recommend checking his CBC once every 6 to 12 months.  If there is any progressive decline in the platelet count or neutrophil count or hemoglobin then we happy to see him back.  I expect  fluctuation in his white count and platelet count and as long as he is not symptomatic there is no need to be worried about it.    He is agreeable to the plan.      ECOG Performance    0 - Independent    Problem List    There is no problem list on file for this patient.    ______________________________________________________________________________    Staging History     Cancer Staging   No matching staging information was found for the patient.      History of presenting illness:  Phu Jackson is a 72-year-old male with history of renal cysts, osteoarthritis, elevated creatinine who has been referred by his primary care provider for further evaluation management of chronic leukopenia and thrombocytopenia.    Patient reports that he was noted to have mild neutropenia and thrombocytopenia about 6 years ago.  He saw a hematologist in [place].  He was told that this could potentially be something related to a bone marrow process.  But no bone marrow biopsy was performed.  Recommended follow-up with intermittent testing.  Recently it was noted by his primary care provider that his neutrophil count was going down.  Total white count was 3.8 in July and fell down to 3.4.  Platelet count has been around 1 30-1 40,000.  Normal hemoglobin.  He was referred to us for further evaluation management of this.  He denies any particular symptoms.  No fever chills or night sweats.  No recurrent infections.  Denies any mouth sores.  No significant weight loss.  He is very active.  No recent infections.  He was on Fosamax which was discontinued recently due to elevated creatinine.  Denies taking any over-the-counter NSAIDs.  No prior history of chemotherapy or radiation.    Former smoker but quit many decades ago.  Does not drink alcohol.  No personal history of any autoimmune disorders.      Past History    No past medical history on file. No family history on file.   No past surgical history on file. Social History      Socioeconomic History     Marital status:      Spouse name: Not on file     Number of children: Not on file     Years of education: Not on file     Highest education level: Not on file   Occupational History     Not on file   Tobacco Use     Smoking status: Former     Types: Cigarettes     Smokeless tobacco: Never     Tobacco comments:     Quit 50 years ago **as of 11/09/22**   Vaping Use     Vaping Use: Never used   Substance and Sexual Activity     Alcohol use: Not Currently     Comment: has not drank in 20 years **as of 11/09/22**     Drug use: Not Currently     Sexual activity: Not on file   Other Topics Concern     Not on file   Social History Narrative     Not on file     Social Determinants of Health     Financial Resource Strain: Not on file   Food Insecurity: Not on file   Transportation Needs: Not on file   Physical Activity: Not on file   Stress: Not on file   Social Connections: Not on file   Intimate Partner Violence: Not on file   Housing Stability: Not on file        Allergies    No Known Allergies    Review of Systems    Pertinent items are noted in HPI.      Physical Exam    Oncology Vitals 11/9/2022   Height 177 cm   Weight 65.409 kg   BSA (m2) 1.79 m2   /64   Pulse 54   Temp 97.7   Temp src 2   SpO2 99   Pain Score 0       General: Alert, cooperative, no distress  HEENT: Atraumatic and normocephalic  Lungs: Clear to auscultation bilaterally  CVS: S1-S2 heard, regular rate and rhythm  Abdomen: Soft, nontender, no splenomegaly  Lymphatic system: Has very small bilateral cervical lymph nodes measuring less than 1 cm, no other adenopathy  Skin: No rashes or bruises  Neuro: Alert and oriented x3    Lab Results    Recent Results (from the past 168 hour(s))   Extra Purple Top EDTA (LAB USE ONLY)   Result Value Ref Range    Hold Specimen JIC    Lactate Dehydrogenase   Result Value Ref Range    Lactate Dehydrogenase 169 0 - 250 U/L   Immature PLT Fraction   Result Value Ref Range     Immature Platelet Fraction 9.2 (H) 1.0 - 7.0 %   CBC with platelets and differential   Result Value Ref Range    WBC Count 3.9 (L) 4.0 - 11.0 10e3/uL    RBC Count 4.33 (L) 4.40 - 5.90 10e6/uL    Hemoglobin 14.0 13.3 - 17.7 g/dL    Hematocrit 40.9 40.0 - 53.0 %    MCV 95 78 - 100 fL    MCH 32.3 26.5 - 33.0 pg    MCHC 34.2 31.5 - 36.5 g/dL    RDW 12.9 10.0 - 15.0 %    Platelet Count 142 (L) 150 - 450 10e3/uL    % Neutrophils 61 %    % Lymphocytes 28 %    % Monocytes 9 %    % Eosinophils 1 %    % Basophils 1 %    % Immature Granulocytes 0 %    NRBCs per 100 WBC 0 <1 /100    Absolute Neutrophils 2.4 1.6 - 8.3 10e3/uL    Absolute Lymphocytes 1.1 0.8 - 5.3 10e3/uL    Absolute Monocytes 0.3 0.0 - 1.3 10e3/uL    Absolute Eosinophils 0.0 0.0 - 0.7 10e3/uL    Absolute Basophils 0.0 0.0 - 0.2 10e3/uL    Absolute Immature Granulocytes 0.0 <=0.4 10e3/uL    Absolute NRBCs 0.0 10e3/uL   Reticulocyte count   Result Value Ref Range    % Reticulocyte 1.0 0.8 - 2.7 %    Absolute Reticulocyte 0.044 0.010 - 0.110 10e6/uL       Imaging Results    US Renal Complete    Result Date: 11/2/2022  EXAMINATION: US RENAL COMPLETE NON-VASCULAR, 11/1/2022 2:45 PM COMPARISON: 9/13/2019 HISTORY: Kidney Cysts TECHNIQUE: The abdomen was scanned in standard fashion with specialized ultrasound transducer(s) using both gray-scale and limited color Doppler techniques. FINDINGS: Renal: Normal echogenicity. No hydronephrosis.   Right measures- 10.7 cm Increased 3.2 x 3.1 x 3.8 cm superior renal cortical cyst previously 2.6 x 2.4 x 2.5 cm on 9/13/2019 ultrasound. Circumscribed, thin-walled, anechoic (espcially on cine images) with thin, incomplete, internal septations Left measures- 10.8 cm. At least 3 anechoic, thin-walled, round renal cortical cysts. Stable measuring 1.1 x 1.3 x 1.4 cm and 0.8 x 0.8 cm in the mid kidney. One cyst is new in the superior kidney measuring 1.1 x 0.9 x 0.7 cm. Urinary bladder: No urinary bladder wall thickening.     IMPRESSION:  1.  Increased 3.8 cm superior right renal cortical cyst compared to 9/13/2019; minimally complex and benign appearing, likely Bosniak 2. 2.  Left renal cortical cysts, one of which is new and all of which appear benign, likely Bosniak 1. 3.  No hydronephrosis or stone. *Recommendations above based on LI-RADS? v2017: https://www.acr.org/Clinical-Resources/Reporting-and-Data-Systems/LI-R DS/Ultrasound-LI-RADS-v2017 I have personally reviewed the examination and initial interpretation and I agree with the findings. NAVI CRISTOBAL MD   SYSTEM ID:  [id number]      A total of 30 minutes was spent today on this visit including face to face conversation with the patient, EMR review (labs, imaging studies, pathology reports and outside records), counseling and care co-ordination and documentation.    Signed by: Sade Del Cid MD

## 2022-11-09 NOTE — PROGRESS NOTES
"Oncology Rooming Note    November 9, 2022 1:44 PM   Phu Jackson is a 72 year old male who presents for:    Chief Complaint   Patient presents with     Hematology     New patient consult related to Lymphopenia     Initial Vitals: /64 (BP Location: Right arm, Patient Position: Sitting, Cuff Size: Adult Regular)   Pulse 54   Temp 97.7  F (36.5  C) (Tympanic)   Resp 14   Ht 1.772 m (5' 9.75\")   Wt 65.4 kg (144 lb 3.2 oz)   SpO2 99%   BMI 20.84 kg/m   Estimated body mass index is 20.84 kg/m  as calculated from the following:    Height as of this encounter: 1.772 m (5' 9.75\").    Weight as of this encounter: 65.4 kg (144 lb 3.2 oz). Body surface area is 1.79 meters squared.  No Pain (0) Comment: Data Unavailable   No LMP for male patient.  Allergies reviewed: Yes  Medications reviewed: Yes    Medications: Medication refills not needed today.  Pharmacy name entered into [employer]: [id number] - [place], [place] - [address] [facility]    Clinical concerns: New patient consult related to Lymphopenia        WILLIE NEGRETE CMA            "

## 2022-11-09 NOTE — LETTER
"    11/9/2022         RE: Phu Jackson  [address]  [place] [address]        Dear Colleague,    Thank you for referring your patient, Phu Jackson, to the [facility] [place]. Please see a copy of my visit note below.    Oncology Rooming Note    November 9, 2022 1:44 PM   Phu Jackson is a 72 year old male who presents for:    Chief Complaint   Patient presents with     Hematology     New patient consult related to Lymphopenia     Initial Vitals: /64 (BP Location: Right arm, Patient Position: Sitting, Cuff Size: Adult Regular)   Pulse 54   Temp 97.7  F (36.5  C) (Tympanic)   Resp 14   Ht 1.772 m (5' 9.75\")   Wt 65.4 kg (144 lb 3.2 oz)   SpO2 99%   BMI 20.84 kg/m   Estimated body mass index is 20.84 kg/m  as calculated from the following:    Height as of this encounter: 1.772 m (5' 9.75\").    Weight as of this encounter: 65.4 kg (144 lb 3.2 oz). Body surface area is 1.79 meters squared.  No Pain (0) Comment: Data Unavailable   No LMP for male patient.  Allergies reviewed: Yes  Medications reviewed: Yes    Medications: Medication refills not needed today.  Pharmacy name entered into [employer]: [id number] - [address]    Clinical concerns: New patient consult related to Lymphopenia        WILLIE NEGRETE CMA              [facility] Hematology and Oncology Consult Note    Patient: Phu Jackson  MRN: [id number]  Date of Service: 11/09/2022      Reason for Visit    Chief Complaint   Patient presents with     Hematology     New patient consult related to Lymphopenia         Assessment/Plan    Problem List Items Addressed This Visit    None  Visit Diagnoses     Thrombocytopenia (H)    -  Primary    Lymphopenia        Leukopenia, unspecified type            Chronic leukopenia and cytopenia  I reviewed his chart in detail today.  He has history of chronic intermittent leukopenia and mild thrombocytopenia.  Etiology is not clear but I " suspect this probably due to an autoimmune process.  I repeated his labs today.  His neutrophil count is normal.  Total white count trend is to be slightly low at 3.9.  Lymphocyte count is also normal.  Platelet count is 140 K.  Has elevated IPF which suggest good marrow function and evidence of peripheral platelet destruction.  The fact that he has not had any issues with recurrent infections or other signs symptoms of severe neutropenia, it is reassuring that he has probably adequate marrow function and is able to meet the demands if needed.    I do not think there is any major concern for underlying bone marrow pathology like MDS or other myeloid malignancies.  His hemoglobin is normal and MCV is normal.  LDH is also normal.  I have ordered B12 testing just in case and it is pending.  Other potential causes could be drug-induced thrombocytopenia and leukopenia.  Fosamax was recently discontinued.  He does not look like he is on any medications that would cause significant cytopenias.  Denies taking any over-the-counter NSAIDs in the recent past.    Overall I am not overly concerned about his mild cytopenias.  I recommend checking his CBC once every 6 to 12 months.  If there is any progressive decline in the platelet count or neutrophil count or hemoglobin then we happy to see him back.  I expect fluctuation in his white count and platelet count and as long as he is not symptomatic there is no need to be worried about it.    He is agreeable to the plan.      ECOG Performance    0 - Independent    Problem List    There is no problem list on file for this patient.    ______________________________________________________________________________    Staging History     Cancer Staging   No matching staging information was found for the patient.      History of presenting illness:  Phu Jackson is a 72-year-old male with history of renal cysts, osteoarthritis, elevated creatinine who has been referred by his primary  care provider for further evaluation management of chronic leukopenia and thrombocytopenia.    Patient reports that he was noted to have mild neutropenia and thrombocytopenia about 6 years ago.  He saw a hematologist in [place].  He was told that this could potentially be something related to a bone marrow process.  But no bone marrow biopsy was performed.  Recommended follow-up with intermittent testing.  Recently it was noted by his primary care provider that his neutrophil count was going down.  Total white count was 3.8 in July and fell down to 3.4.  Platelet count has been around 1 30-1 40,000.  Normal hemoglobin.  He was referred to us for further evaluation management of this.  He denies any particular symptoms.  No fever chills or night sweats.  No recurrent infections.  Denies any mouth sores.  No significant weight loss.  He is very active.  No recent infections.  He was on Fosamax which was discontinued recently due to elevated creatinine.  Denies taking any over-the-counter NSAIDs.  No prior history of chemotherapy or radiation.    Former smoker but quit many decades ago.  Does not drink alcohol.  No personal history of any autoimmune disorders.      Past History    No past medical history on file. No family history on file.   No past surgical history on file. Social History     Socioeconomic History     Marital status:      Spouse name: Not on file     Number of children: Not on file     Years of education: Not on file     Highest education level: Not on file   Occupational History     Not on file   Tobacco Use     Smoking status: Former     Types: Cigarettes     Smokeless tobacco: Never     Tobacco comments:     Quit 50 years ago **as of 11/09/22**   Vaping Use     Vaping Use: Never used   Substance and Sexual Activity     Alcohol use: Not Currently     Comment: has not drank in 20 years **as of 11/09/22**     Drug use: Not Currently     Sexual activity: Not on file   Other Topics Concern      Not on file   Social History Narrative     Not on file     Social Determinants of Health     Financial Resource Strain: Not on file   Food Insecurity: Not on file   Transportation Needs: Not on file   Physical Activity: Not on file   Stress: Not on file   Social Connections: Not on file   Intimate Partner Violence: Not on file   Housing Stability: Not on file        Allergies    No Known Allergies    Review of Systems    Pertinent items are noted in HPI.      Physical Exam    Oncology Vitals 11/9/2022   Height 177 cm   Weight 65.409 kg   BSA (m2) 1.79 m2   /64   Pulse 54   Temp 97.7   Temp src 2   SpO2 99   Pain Score 0       General: Alert, cooperative, no distress  HEENT: Atraumatic and normocephalic  Lungs: Clear to auscultation bilaterally  CVS: S1-S2 heard, regular rate and rhythm  Abdomen: Soft, nontender, no splenomegaly  Lymphatic system: Has very small bilateral cervical lymph nodes measuring less than 1 cm, no other adenopathy  Skin: No rashes or bruises  Neuro: Alert and oriented x3    Lab Results    Recent Results (from the past 168 hour(s))   Extra Purple Top EDTA (LAB USE ONLY)   Result Value Ref Range    Hold Specimen JI    Lactate Dehydrogenase   Result Value Ref Range    Lactate Dehydrogenase 169 0 - 250 U/L   Immature PLT Fraction   Result Value Ref Range    Immature Platelet Fraction 9.2 (H) 1.0 - 7.0 %   CBC with platelets and differential   Result Value Ref Range    WBC Count 3.9 (L) 4.0 - 11.0 10e3/uL    RBC Count 4.33 (L) 4.40 - 5.90 10e6/uL    Hemoglobin 14.0 13.3 - 17.7 g/dL    Hematocrit 40.9 40.0 - 53.0 %    MCV 95 78 - 100 fL    MCH 32.3 26.5 - 33.0 pg    MCHC 34.2 31.5 - 36.5 g/dL    RDW 12.9 10.0 - 15.0 %    Platelet Count 142 (L) 150 - 450 10e3/uL    % Neutrophils 61 %    % Lymphocytes 28 %    % Monocytes 9 %    % Eosinophils 1 %    % Basophils 1 %    % Immature Granulocytes 0 %    NRBCs per 100 WBC 0 <1 /100    Absolute Neutrophils 2.4 1.6 - 8.3 10e3/uL    Absolute Lymphocytes  1.1 0.8 - 5.3 10e3/uL    Absolute Monocytes 0.3 0.0 - 1.3 10e3/uL    Absolute Eosinophils 0.0 0.0 - 0.7 10e3/uL    Absolute Basophils 0.0 0.0 - 0.2 10e3/uL    Absolute Immature Granulocytes 0.0 <=0.4 10e3/uL    Absolute NRBCs 0.0 10e3/uL   Reticulocyte count   Result Value Ref Range    % Reticulocyte 1.0 0.8 - 2.7 %    Absolute Reticulocyte 0.044 0.010 - 0.110 10e6/uL       Imaging Results    US Renal Complete    Result Date: 11/2/2022  EXAMINATION: US RENAL COMPLETE NON-VASCULAR, 11/1/2022 2:45 PM COMPARISON: 9/13/2019 HISTORY: Kidney Cysts TECHNIQUE: The abdomen was scanned in standard fashion with specialized ultrasound transducer(s) using both gray-scale and limited color Doppler techniques. FINDINGS: Renal: Normal echogenicity. No hydronephrosis.   Right measures- 10.7 cm Increased 3.2 x 3.1 x 3.8 cm superior renal cortical cyst previously 2.6 x 2.4 x 2.5 cm on 9/13/2019 ultrasound. Circumscribed, thin-walled, anechoic (espcially on cine images) with thin, incomplete, internal septations Left measures- 10.8 cm. At least 3 anechoic, thin-walled, round renal cortical cysts. Stable measuring 1.1 x 1.3 x 1.4 cm and 0.8 x 0.8 cm in the mid kidney. One cyst is new in the superior kidney measuring 1.1 x 0.9 x 0.7 cm. Urinary bladder: No urinary bladder wall thickening.     IMPRESSION: 1.  Increased 3.8 cm superior right renal cortical cyst compared to 9/13/2019; minimally complex and benign appearing, likely Bosniak 2. 2.  Left renal cortical cysts, one of which is new and all of which appear benign, likely Bosniak 1. 3.  No hydronephrosis or stone. *Recommendations above based on LI-RADS? v2017: https://www.acr.org/Clinical-Resources/Reporting-and-Data-Systems/LI-R DS/Ultrasound-LI-RADS-v2017 I have personally reviewed the examination and initial interpretation and I agree with the findings. NAVI CRISTOBAL MD   SYSTEM ID:  [id number]      A total of 30 minutes was spent today on this visit including face to face  conversation with the patient, EMR review (labs, imaging studies, pathology reports and outside records), counseling and care co-ordination and documentation.    Signed by: Sade Del Cid MD        Again, thank you for allowing me to participate in the care of your patient.        Sincerely,        Sade Del Cid MD

## 2022-11-10 LAB
PATH REPORT.COMMENTS IMP SPEC: NORMAL
PATH REPORT.COMMENTS IMP SPEC: NORMAL
PATH REPORT.FINAL DX SPEC: NORMAL
PATH REPORT.RELEVANT HX SPEC: NORMAL

## 2022-11-10 PROCEDURE — 99207 BLOOD MORPHOLOGY PATHOLOGIST REVIEW: CPT | Performed by: PATHOLOGY

## 2022-11-15 DIAGNOSIS — R79.89 ELEVATED SERUM CREATININE: Primary | ICD-10-CM

## 2022-11-17 ENCOUNTER — LAB (OUTPATIENT)
Dept: LAB | Facility: CLINIC | Age: 72
End: 2022-11-17
Payer: MEDICARE

## 2022-11-17 DIAGNOSIS — R79.89 ELEVATED SERUM CREATININE: ICD-10-CM

## 2022-11-17 LAB
CYSTATIN C (ROCHE): 1 MG/L (ref 0.6–1)
GFR SERPL CREATININE-BSD FRML MDRD: 74 ML/MIN/1.73M2

## 2022-11-17 PROCEDURE — 36415 COLL VENOUS BLD VENIPUNCTURE: CPT | Performed by: PATHOLOGY

## 2022-11-17 PROCEDURE — 82610 CYSTATIN C: CPT | Performed by: INTERNAL MEDICINE

## 2022-11-17 PROCEDURE — 82565 ASSAY OF CREATININE: CPT | Performed by: INTERNAL MEDICINE

## 2022-11-18 DIAGNOSIS — R79.89 ELEVATED SERUM CREATININE: Primary | ICD-10-CM

## 2022-11-18 LAB
CREAT SERPL-MCNC: 1.37 MG/DL (ref 0.67–1.17)
GFR SERPL CREATININE-BSD FRML MDRD: 54 ML/MIN/1.73M2

## 2022-11-21 DIAGNOSIS — R79.89 ELEVATED SERUM CREATININE: Primary | ICD-10-CM

## 2022-12-19 ENCOUNTER — LAB (OUTPATIENT)
Dept: LAB | Facility: CLINIC | Age: 72
End: 2022-12-19
Payer: MEDICARE

## 2022-12-19 DIAGNOSIS — R79.89 ELEVATED SERUM CREATININE: ICD-10-CM

## 2022-12-19 LAB
ALBUMIN MFR UR ELPH: <6 MG/DL
ALBUMIN SERPL BCG-MCNC: 4.3 G/DL (ref 3.5–5.2)
ALBUMIN UR-MCNC: NEGATIVE MG/DL
ANION GAP SERPL CALCULATED.3IONS-SCNC: 11 MMOL/L (ref 7–15)
APPEARANCE UR: CLEAR
BASOPHILS # BLD AUTO: 0 10E3/UL (ref 0–0.2)
BASOPHILS NFR BLD AUTO: 0 %
BILIRUB UR QL STRIP: NEGATIVE
BUN SERPL-MCNC: 31.7 MG/DL (ref 8–23)
CALCIUM SERPL-MCNC: 9.1 MG/DL (ref 8.8–10.2)
CHLORIDE SERPL-SCNC: 100 MMOL/L (ref 98–107)
COLOR UR AUTO: NORMAL
CREAT SERPL-MCNC: 1.36 MG/DL (ref 0.67–1.17)
CREAT UR-MCNC: 11.3 MG/DL
CYSTATIN C (ROCHE): 0.9 MG/L (ref 0.6–1)
DEPRECATED HCO3 PLAS-SCNC: 25 MMOL/L (ref 22–29)
EOSINOPHIL # BLD AUTO: 0 10E3/UL (ref 0–0.7)
EOSINOPHIL NFR BLD AUTO: 1 %
ERYTHROCYTE [DISTWIDTH] IN BLOOD BY AUTOMATED COUNT: 13.1 % (ref 10–15)
GFR SERPL CREATININE-BSD FRML MDRD: 55 ML/MIN/1.73M2
GFR SERPL CREATININE-BSD FRML MDRD: 85 ML/MIN/1.73M2
GLUCOSE SERPL-MCNC: 75 MG/DL (ref 70–99)
GLUCOSE UR STRIP-MCNC: NEGATIVE MG/DL
HCT VFR BLD AUTO: 38.7 % (ref 40–53)
HGB BLD-MCNC: 13.1 G/DL (ref 13.3–17.7)
HGB UR QL STRIP: NEGATIVE
IMM GRANULOCYTES # BLD: 0 10E3/UL
IMM GRANULOCYTES NFR BLD: 0 %
KETONES UR STRIP-MCNC: NEGATIVE MG/DL
LEUKOCYTE ESTERASE UR QL STRIP: NEGATIVE
LYMPHOCYTES # BLD AUTO: 1.5 10E3/UL (ref 0.8–5.3)
LYMPHOCYTES NFR BLD AUTO: 31 %
MCH RBC QN AUTO: 32.3 PG (ref 26.5–33)
MCHC RBC AUTO-ENTMCNC: 33.9 G/DL (ref 31.5–36.5)
MCV RBC AUTO: 96 FL (ref 78–100)
MONOCYTES # BLD AUTO: 0.4 10E3/UL (ref 0–1.3)
MONOCYTES NFR BLD AUTO: 9 %
NEUTROPHILS # BLD AUTO: 2.9 10E3/UL (ref 1.6–8.3)
NEUTROPHILS NFR BLD AUTO: 59 %
NITRATE UR QL: NEGATIVE
NRBC # BLD AUTO: 0 10E3/UL
NRBC BLD AUTO-RTO: 0 /100
PH UR STRIP: 7 [PH] (ref 5–7)
PHOSPHATE SERPL-MCNC: 2.9 MG/DL (ref 2.5–4.5)
PLATELET # BLD AUTO: 146 10E3/UL (ref 150–450)
POTASSIUM SERPL-SCNC: 4.1 MMOL/L (ref 3.4–5.3)
PROT/CREAT 24H UR: NORMAL MG/G{CREAT}
PTH-INTACT SERPL-MCNC: 49 PG/ML (ref 15–65)
RBC # BLD AUTO: 4.05 10E6/UL (ref 4.4–5.9)
RBC URINE: 1 /HPF
SODIUM SERPL-SCNC: 136 MMOL/L (ref 136–145)
SP GR UR STRIP: 1 (ref 1–1.03)
UROBILINOGEN UR STRIP-MCNC: NORMAL MG/DL
WBC # BLD AUTO: 4.9 10E3/UL (ref 4–11)
WBC URINE: 0 /HPF

## 2022-12-19 PROCEDURE — 85025 COMPLETE CBC W/AUTO DIFF WBC: CPT | Performed by: PATHOLOGY

## 2022-12-19 PROCEDURE — 36415 COLL VENOUS BLD VENIPUNCTURE: CPT | Performed by: PATHOLOGY

## 2022-12-19 PROCEDURE — 84156 ASSAY OF PROTEIN URINE: CPT | Performed by: PATHOLOGY

## 2022-12-19 PROCEDURE — 82610 CYSTATIN C: CPT | Performed by: INTERNAL MEDICINE

## 2022-12-19 PROCEDURE — 81001 URINALYSIS AUTO W/SCOPE: CPT | Performed by: PATHOLOGY

## 2022-12-19 PROCEDURE — 80069 RENAL FUNCTION PANEL: CPT | Performed by: PATHOLOGY

## 2022-12-19 PROCEDURE — 83970 ASSAY OF PARATHORMONE: CPT | Performed by: PATHOLOGY

## 2023-01-29 ENCOUNTER — HEALTH MAINTENANCE LETTER (OUTPATIENT)
Age: 73
End: 2023-01-29

## 2023-02-21 ENCOUNTER — TELEPHONE (OUTPATIENT)
Dept: NEPHROLOGY | Facility: CLINIC | Age: 73
End: 2023-02-21
Payer: MEDICARE

## 2023-02-21 ENCOUNTER — TELEPHONE (OUTPATIENT)
Dept: MULTI SPECIALTY CLINIC | Facility: CLINIC | Age: 73
End: 2023-02-21
Payer: MEDICARE

## 2023-02-21 DIAGNOSIS — R79.89 ELEVATED SERUM CREATININE: Primary | ICD-10-CM

## 2023-02-21 DIAGNOSIS — E55.9 VITAMIN D DEFICIENCY, UNSPECIFIED: ICD-10-CM

## 2023-02-21 NOTE — TELEPHONE ENCOUNTER
Sent [address] (2nd Attempt) for the patient to call back and schedule the following:    Appointment type: Reschedule for 3.20.23  Provider: Sherry  Return date: next available  Specialty phone number: [phone]  Additional appointment(s) needed: lab  Additonal Notes: n/a

## 2023-02-21 NOTE — TELEPHONE ENCOUNTER
[facility] Call Center    Phone Message    May a detailed message be left on voicemail: yes     Reason for Call: Other: Lab Orders Needed for 5/15 [employer] Appt       Please add orders. Thank you!    Action Taken: Other: Nephrology    Travel Screening: Not Applicable

## 2023-02-23 ENCOUNTER — TELEPHONE (OUTPATIENT)
Dept: MULTI SPECIALTY CLINIC | Facility: CLINIC | Age: 73
End: 2023-02-23
Payer: MEDICARE

## 2023-02-23 NOTE — TELEPHONE ENCOUNTER
SHANNAN to inform patient they can reschedule their appt with Dr. Powell on 5.15.23 to be with Bee Cagle PA-C as their appt on 5.15.23 is on the waitlist // first attempt, AN 2.23.23

## 2023-02-24 ENCOUNTER — TELEPHONE (OUTPATIENT)
Dept: NEPHROLOGY | Facility: CLINIC | Age: 73
End: 2023-02-24
Payer: MEDICARE

## 2023-02-24 NOTE — TELEPHONE ENCOUNTER
[facility] Call Center    Phone Message    May a detailed message be left on voicemail: yes     Reason for Call: Please reach out to help patient reschedule appt. In last TE, Bee reached out to patient who was on wait list to reschedule appt with Dr. Powell over to Bee Cagle. Patient states the message left on VM was offering 3/3 date. Writer attempted to help in scheduling this but received a message that it was incorrectly scheduled. Writer not sure what clinic would like.     Please reach out to patient to correct. Thank you    Action Taken: Message routed to:  [facility] NEPH    Travel Screening: Not Applicable

## 2023-02-28 NOTE — TELEPHONE ENCOUNTER
Writer left message with patient regarding message with Libby Cagle coming up. As it appears to be in [facility] clinic.  Provided number for call back  Gela Cassidy LPN  Nephrology  [phone]

## 2023-03-01 ENCOUNTER — LAB (OUTPATIENT)
Dept: LAB | Facility: CLINIC | Age: 73
End: 2023-03-01
Payer: MEDICARE

## 2023-03-01 ENCOUNTER — TELEPHONE (OUTPATIENT)
Dept: NEPHROLOGY | Facility: CLINIC | Age: 73
End: 2023-03-01

## 2023-03-01 DIAGNOSIS — E55.9 VITAMIN D DEFICIENCY, UNSPECIFIED: ICD-10-CM

## 2023-03-01 DIAGNOSIS — R79.89 ELEVATED SERUM CREATININE: ICD-10-CM

## 2023-03-01 LAB
ALBUMIN MFR UR ELPH: <6 MG/DL (ref 1–14)
ALBUMIN SERPL BCG-MCNC: 4.3 G/DL (ref 3.5–5.2)
ALBUMIN UR-MCNC: NEGATIVE MG/DL
ANION GAP SERPL CALCULATED.3IONS-SCNC: 8 MMOL/L (ref 7–15)
APPEARANCE UR: CLEAR
BILIRUB UR QL STRIP: NEGATIVE
BUN SERPL-MCNC: 24.6 MG/DL (ref 8–23)
CALCIUM SERPL-MCNC: 9.5 MG/DL (ref 8.8–10.2)
CHLORIDE SERPL-SCNC: 107 MMOL/L (ref 98–107)
COLOR UR AUTO: NORMAL
CREAT SERPL-MCNC: 1.35 MG/DL (ref 0.67–1.17)
CREAT UR-MCNC: 30.8 MG/DL
CYSTATIN C (ROCHE): 1 MG/L (ref 0.6–1)
DEPRECATED CALCIDIOL+CALCIFEROL SERPL-MC: 35 UG/L (ref 20–75)
DEPRECATED HCO3 PLAS-SCNC: 26 MMOL/L (ref 22–29)
ERYTHROCYTE [DISTWIDTH] IN BLOOD BY AUTOMATED COUNT: 13.2 % (ref 10–15)
GFR SERPL CREATININE-BSD FRML MDRD: 55 ML/MIN/1.73M2
GFR SERPL CREATININE-BSD FRML MDRD: 74 ML/MIN/1.73M2
GLUCOSE SERPL-MCNC: 87 MG/DL (ref 70–99)
GLUCOSE UR STRIP-MCNC: NEGATIVE MG/DL
HCT VFR BLD AUTO: 39.3 % (ref 40–53)
HGB BLD-MCNC: 13 G/DL (ref 13.3–17.7)
HGB UR QL STRIP: NEGATIVE
KETONES UR STRIP-MCNC: NEGATIVE MG/DL
LEUKOCYTE ESTERASE UR QL STRIP: NEGATIVE
MCH RBC QN AUTO: 32.2 PG (ref 26.5–33)
MCHC RBC AUTO-ENTMCNC: 33.1 G/DL (ref 31.5–36.5)
MCV RBC AUTO: 97 FL (ref 78–100)
NITRATE UR QL: NEGATIVE
PH UR STRIP: 6.5 [PH] (ref 5–7)
PHOSPHATE SERPL-MCNC: 3.1 MG/DL (ref 2.5–4.5)
PLATELET # BLD AUTO: 118 10E3/UL (ref 150–450)
POTASSIUM SERPL-SCNC: 4.9 MMOL/L (ref 3.4–5.3)
PROT/CREAT 24H UR: NORMAL MG/G{CREAT}
PTH-INTACT SERPL-MCNC: 38 PG/ML (ref 15–65)
RBC # BLD AUTO: 4.04 10E6/UL (ref 4.4–5.9)
RBC URINE: 0 /HPF
SODIUM SERPL-SCNC: 141 MMOL/L (ref 136–145)
SP GR UR STRIP: 1.01 (ref 1–1.03)
UROBILINOGEN UR STRIP-MCNC: NORMAL MG/DL
WBC # BLD AUTO: 4.3 10E3/UL (ref 4–11)
WBC URINE: <1 /HPF

## 2023-03-01 PROCEDURE — 84156 ASSAY OF PROTEIN URINE: CPT | Performed by: PATHOLOGY

## 2023-03-01 PROCEDURE — 85027 COMPLETE CBC AUTOMATED: CPT | Performed by: PATHOLOGY

## 2023-03-01 PROCEDURE — 82306 VITAMIN D 25 HYDROXY: CPT | Performed by: INTERNAL MEDICINE

## 2023-03-01 PROCEDURE — 83970 ASSAY OF PARATHORMONE: CPT | Performed by: PATHOLOGY

## 2023-03-01 PROCEDURE — 82610 CYSTATIN C: CPT | Performed by: INTERNAL MEDICINE

## 2023-03-01 PROCEDURE — 81001 URINALYSIS AUTO W/SCOPE: CPT | Performed by: PATHOLOGY

## 2023-03-01 PROCEDURE — 36415 COLL VENOUS BLD VENIPUNCTURE: CPT | Performed by: PATHOLOGY

## 2023-03-01 PROCEDURE — 80069 RENAL FUNCTION PANEL: CPT | Performed by: PATHOLOGY

## 2023-03-01 NOTE — TELEPHONE ENCOUNTER
Writer spoke to the patient and mentioned that Dr Powell had an opening tomorrow at 930 in person if the patient was able to come then. Patient was agreeable. Lab appointment scheduled for today at 1145. Cancelled appointment with LILLIAN Vargas LPN  Nephrology  [phone]

## 2023-03-02 ENCOUNTER — OFFICE VISIT (OUTPATIENT)
Dept: NEPHROLOGY | Facility: CLINIC | Age: 73
End: 2023-03-02
Attending: INTERNAL MEDICINE
Payer: MEDICARE

## 2023-03-02 VITALS
RESPIRATION RATE: 14 BRPM | HEIGHT: 70 IN | HEART RATE: 51 BPM | BODY MASS INDEX: 21.64 KG/M2 | SYSTOLIC BLOOD PRESSURE: 110 MMHG | OXYGEN SATURATION: 98 % | WEIGHT: 151.2 LBS | DIASTOLIC BLOOD PRESSURE: 68 MMHG

## 2023-03-02 DIAGNOSIS — N28.1 RENAL CYST: ICD-10-CM

## 2023-03-02 DIAGNOSIS — R79.89 ELEVATED SERUM CREATININE: Primary | ICD-10-CM

## 2023-03-02 PROCEDURE — G0463 HOSPITAL OUTPT CLINIC VISIT: HCPCS | Performed by: INTERNAL MEDICINE

## 2023-03-02 PROCEDURE — 99215 OFFICE O/P EST HI 40 MIN: CPT | Performed by: INTERNAL MEDICINE

## 2023-03-02 NOTE — PATIENT INSTRUCTIONS
Your kidney function by cystatin C suggest normal kidney function  You have no protein or blood in the urine so your kidney is working well  Stay well hydrated  Will refer you to see urology for the cyst

## 2023-03-02 NOTE — LETTER
3/2/2023       RE: Phu Jackson  [address]  [facility] [address]       Dear Colleague,    Thank you for referring your patient, Phu Jackson, to the [facility]. Please see a copy of my visit note below.      Nephrology Progress Note  03/02/2023   Chief complaint: Follow-up for elevated Creatinine  History of Present Illness:    Phu Jackson is a 72 year old with medical history of right kidney cysts, arthritis, depression who is here for concerning of elevated creatinine.     He know that his eGFr was below 60 since about 12 years ago. He then was told to drink more fluid.  Since then eGFR have been fluctuated but the were in 60s. In 2014, he saw Dr. Ray Wilson at [facility] in [place]. He had a kidney ultrasound which showed rt kidney cyst. At that time, it was thought that his Cr elevation was due to his high muscle mass/high protein and also NSAIDs use. He was noted to use NSAIDs daily for about 20 years. Then, his BP was 100/64 and his BMI was 21.2. Cr was noted between 1.2-1.3.  His creatinine in 9/13/2021 was 1.36 and improved to 1.30 on 2/20 8/8/2022 and was 1.34 on 7/25/2022.  However, creatinine started to increase to 1.47 on 9/26/2022 and the most recent 1 is 1.56 on 10/31/2022 today.     He is a very active fozia. He used to run 6 miles per day almost daily until 2018 when he was found to have arthritis in his back. Current, he does 200 push ups daily, walking 3.5 miles 2/wk, swimming 1.25 miles/wk and weight lifting 25 min followed by elliptical 20 minute. He used to drink but stopped completely in 2002. He last smoked cigarette back 50 years ago. He drinks 3 shots of lattes X 3 times/day. He drinks 4-6 can diet rootbeers/day. He ingested about 120 grams of protein/day mainly through milk and other food.     Today, he feels worried about his Cr. He has no leg swelling. He dropped  "weight in 2016 due to mental issue but better now. He has no DM, high cholesterol or HTN. He has \"weak\" bladder but mo other problem with urination. He had cyst in his right kidney that was found in 2014 but no follow-up.  He ingested protein in the form of milk, 3 X 16 Oz lattes with 3 shots each. He eats about 120 g of protein per day. He has no family Hx of kidney disease. He drinks water about 27-28 Oz per day. He felled on his hip in summer 2022 and started to take Fosamax since early September 2022. He went to [facility] sport clinic and had bone density scan test which showed that he had osteopenia. He was an . He lives with his wife. He has a child from previous marriage and she lives in [place].     10/31/22: seen for consultation. Order cystatin C and kidney US. Wondering about Fosamax and Cr elevation but after repeat Cr they were stable. US showed increased RK cyst at 3.8 cm, BOSNIAK 2.  3/2/23: He feels ok today. He saw blood specialist in the past and they thought that he less likely has MDS. He does not feel short of breath. Creatinine 1.35, EGFR 55 by creatinine, BUN 24.6, potassium 4.9,, dioxide 26, calcium 9.5, phosphorus 3.1, albumin 4.3 Cystatin C 1, EGFR by Cystatin C 74 (which is stable). Hb 13.0. UA is bland without blood and no protein.  Urine protein creatinine ratio is less than 6.0. PTH is 38. Vit D is 35.     Past medical history  No past medical history on file.    Past surgical history  No past surgical history on file.    Review of Systems:   14 systems were reviewed and all negative except as mentioned above.   Current Medications:  Current Outpatient Medications   Medication     alendronate (FOSAMAX) 70 MG tablet     atorvastatin (LIPITOR) 20 MG tablet     escitalopram (LEXAPRO) 20 MG tablet     finasteride (PROSCAR) 5 MG tablet     atropine 1 % ophthalmic solution     Carboxymethylcellulose Sodium (EYE DROPS OP)     tamsulosin (FLOMAX) 0.4 MG capsule     No current " "facility-administered medications for this visit.       Physical Exam:   /68 (BP Location: Right arm, Patient Position: Sitting, Cuff Size: Adult Large)   Pulse 51   Resp 14   Ht 1.772 m (5' 9.76\")   Wt 68.6 kg (151 lb 3.2 oz)   SpO2 98%   BMI 21.84 kg/m     Body mass index is 21.84 kg/m .    GENERAL APPEARANCE: Alert, not in acute distress  EYES:  Not pale conjunctiva, pupils equal  HENT: Mouth without ulcers or lesions  PULM: lungs clear to auscultation bilaterally, equal air movement, no clubbing  CV: regular rhythm, normal rate, no rub     -JVD no distended.      -edema: None  GI: soft,  - tender, no distended, bowel sounds are present  INTEGUMENT: No rash  NEURO:  Non focal. No asterixis.    Labs:   All labs reviewed by me  Last Renal Panel:  Sodium   Date Value Ref Range Status   03/01/2023 141 136 - 145 mmol/L Final     Potassium   Date Value Ref Range Status   03/01/2023 4.9 3.4 - 5.3 mmol/L Final   02/28/2022 4.5 3.4 - 5.3 mmol/L Final     Chloride   Date Value Ref Range Status   03/01/2023 107 98 - 107 mmol/L Final   02/28/2022 104 94 - 109 mmol/L Final     Carbon Dioxide (CO2)   Date Value Ref Range Status   03/01/2023 26 22 - 29 mmol/L Final   02/28/2022 27 20 - 32 mmol/L Final     Anion Gap   Date Value Ref Range Status   03/01/2023 8 7 - 15 mmol/L Final   02/28/2022 5 3 - 14 mmol/L Final     Glucose   Date Value Ref Range Status   03/01/2023 87 70 - 99 mg/dL Final   02/28/2022 77 70 - 99 mg/dL Final     Urea Nitrogen   Date Value Ref Range Status   03/01/2023 24.6 (H) 8.0 - 23.0 mg/dL Final   02/28/2022 27 7 - 30 mg/dL Final     Creatinine   Date Value Ref Range Status   03/01/2023 1.35 (H) 0.67 - 1.17 mg/dL Final     GFR Estimate   Date Value Ref Range Status   03/01/2023 55 (L) >60 mL/min/1.73m2 Final     Comment:     eGFR calculated using 2021 CKD-EPI equation.     Calcium   Date Value Ref Range Status   03/01/2023 9.5 8.8 - 10.2 mg/dL Final     Phosphorus   Date Value Ref Range Status "   03/01/2023 3.1 2.5 - 4.5 mg/dL Final     Albumin   Date Value Ref Range Status   03/01/2023 4.3 3.5 - 5.2 g/dL Final   09/13/2021 4.0 3.4 - 5.0 g/dL Final       Imaging:  I reviewed imaging studies.   11/1/22:  1.  Increased 3.8 cm superior right renal cortical cyst compared to  9/13/2019; minimally complex and benign appearing, likely Bosniak 2.  2.  Left renal cortical cysts, one of which is new and all of which  appear benign, likely Bosniak 1.  3.  No hydronephrosis or stone.    Assessment & Recommendations:   Problem list  # Elevated creatinine: likely high muscle mass or impaired creatinine excretion  # Normal kidney function based on cystatin C  # Hx of heavy NSAIDs use 20 years duration stopped since 2013  The patient has been noted to have creatinine elevation at least 12 years ago.  However, his creatinine has been stabilized and fluctuated between 1.2-1.3 for years.His creatinine started to increase to 1.47 on 9/26/2022 and further increased to 1.56 on 10/31/22 with EGFR 47.  Previously, there was a thought that the patient has creatinine elevation due to high muscle mass and high-protein diet.  At that time,  the patient was recently started on Fosamax for osteopenia in early September 2022 and since then creatinine started to increase.  His UA is essentially bland without protein or blood. UPCR has been undetectable. Cystatin C was checked and showed better result than Cr. Cr are in the 1.30 range and cystatin C 0.9-1. On 3/1/23 Cr 1.35 with eGFR 55 and cystatin C 1.0 with eGFR 74. Again, his UA was bland. I share with him that my suspicion for CKD is low as he has no other blood results that would suggest so and UA is completely bland. However, I discuss with him that the best way to estimate kidney function is likely eGFR CystatinC+Cr which likely result in somewhere in between. The best way to assure kidney function is measured GFR like iohexol (we have it here) or iothalamate. I do not think that  we need this right now and he is also not keen on that as it involves a process of radiotracer injection and repeat measure in the urine and blood to calculate. However, if in the future, there is any question, we will be more than happy to arrange that.     - Follow up as needed  - Can follow creatinine and if Cr increase may confirm with cystatin C  - May also consider measured GFR in the future   - Kidney healthy habit: stay well hydrated, avoid NSAIDs and other nephrotoxic agents  # Right kidney cyst  First noted in 2014. Repeat US showed  an increased 3.8 cm superior right renal cortical cyst compared to 9/13/2019; minimally complex and benign appearing, likely Bosniak 2.  - Refer to Urology    Follow-up  As needed    I spent  20 minutes on the date of the encounter doing chart review, history and exam, documentation and further activities as noted above. 20 minutes of this visit is dedicated to direct patient interaction via inperson    Jaye Powell MD on 03/02/2023.        Again, thank you for allowing me to participate in the care of your patient.      Sincerely,    Jaye Powell MD

## 2023-03-02 NOTE — NURSING NOTE
"Chief Complaint   Patient presents with     RECHECK     elevated creatinine     Vital signs:      BP: 110/68 Pulse: 51   Resp: 14 SpO2: 98 %     Height: 177.2 cm (5' 9.76\") Weight: 68.6 kg (151 lb 3.2 oz)  Estimated body mass index is 21.84 kg/m  as calculated from the following:    Height as of this encounter: 1.772 m (5' 9.76\").    Weight as of this encounter: 68.6 kg (151 lb 3.2 oz).        Gin Campa, [facility]  3/2/2023 9:30 AM      "

## 2023-03-02 NOTE — PROGRESS NOTES
"  Nephrology Progress Note  03/02/2023   Chief complaint: Follow-up for elevated Creatinine  History of Present Illness:    Phu Jackson is a 72 year old with medical history of right kidney cysts, arthritis, depression who is here for concerning of elevated creatinine.     He know that his eGFr was below 60 since about 12 years ago. He then was told to drink more fluid.  Since then eGFR have been fluctuated but the were in 60s. In 2014, he saw Dr. Ray Wilson at [facility] in [place]. He had a kidney ultrasound which showed rt kidney cyst. At that time, it was thought that his Cr elevation was due to his high muscle mass/high protein and also NSAIDs use. He was noted to use NSAIDs daily for about 20 years. Then, his BP was 100/64 and his BMI was 21.2. Cr was noted between 1.2-1.3.  His creatinine in 9/13/2021 was 1.36 and improved to 1.30 on 2/20 8/8/2022 and was 1.34 on 7/25/2022.  However, creatinine started to increase to 1.47 on 9/26/2022 and the most recent 1 is 1.56 on 10/31/2022 today.     He is a very active fozia. He used to run 6 miles per day almost daily until 2018 when he was found to have arthritis in his back. Current, he does 200 push ups daily, walking 3.5 miles 2/wk, swimming 1.25 miles/wk and weight lifting 25 min followed by elliptical 20 minute. He used to drink but stopped completely in 2002. He last smoked cigarette back 50 years ago. He drinks 3 shots of lattes X 3 times/day. He drinks 4-6 can diet rootbeers/day. He ingested about 120 grams of protein/day mainly through milk and other food.     Today, he feels worried about his Cr. He has no leg swelling. He dropped weight in 2016 due to mental issue but better now. He has no DM, high cholesterol or HTN. He has \"weak\" bladder but mo other problem with urination. He had cyst in his right kidney that was found in 2014 but no follow-up.  He ingested protein in the form of milk, 3 X 16 Oz lattes with 3 shots each. He eats about " "120 g of protein per day. He has no family Hx of kidney disease. He drinks water about 27-28 Oz per day. He felled on his hip in summer 2022 and started to take Fosamax since early September 2022. He went to [facility] sport clinic and had bone density scan test which showed that he had osteopenia. He was an . He lives with his wife. He has a child from previous marriage and she lives in [place].     10/31/22: seen for consultation. Order cystatin C and kidney US. Wondering about Fosamax and Cr elevation but after repeat Cr they were stable. US showed increased RK cyst at 3.8 cm, BOSNIAK 2.  3/2/23: He feels ok today. He saw blood specialist in the past and they thought that he less likely has MDS. He does not feel short of breath. Creatinine 1.35, EGFR 55 by creatinine, BUN 24.6, potassium 4.9,, dioxide 26, calcium 9.5, phosphorus 3.1, albumin 4.3 Cystatin C 1, EGFR by Cystatin C 74 (which is stable). Hb 13.0. UA is bland without blood and no protein.  Urine protein creatinine ratio is less than 6.0. PTH is 38. Vit D is 35.     Past medical history  No past medical history on file.    Past surgical history  No past surgical history on file.    Review of Systems:   14 systems were reviewed and all negative except as mentioned above.   Current Medications:  Current Outpatient Medications   Medication     alendronate (FOSAMAX) 70 MG tablet     atorvastatin (LIPITOR) 20 MG tablet     escitalopram (LEXAPRO) 20 MG tablet     finasteride (PROSCAR) 5 MG tablet     atropine 1 % ophthalmic solution     Carboxymethylcellulose Sodium (EYE DROPS OP)     tamsulosin (FLOMAX) 0.4 MG capsule     No current facility-administered medications for this visit.       Physical Exam:   /68 (BP Location: Right arm, Patient Position: Sitting, Cuff Size: Adult Large)   Pulse 51   Resp 14   Ht 1.772 m (5' 9.76\")   Wt 68.6 kg (151 lb 3.2 oz)   SpO2 98%   BMI 21.84 kg/m     Body mass index is 21.84 kg/m .    GENERAL APPEARANCE: " Alert, not in acute distress  EYES:  Not pale conjunctiva, pupils equal  HENT: Mouth without ulcers or lesions  PULM: lungs clear to auscultation bilaterally, equal air movement, no clubbing  CV: regular rhythm, normal rate, no rub     -JVD no distended.      -edema: None  GI: soft,  - tender, no distended, bowel sounds are present  INTEGUMENT: No rash  NEURO:  Non focal. No asterixis.    Labs:   All labs reviewed by me  Last Renal Panel:  Sodium   Date Value Ref Range Status   03/01/2023 141 136 - 145 mmol/L Final     Potassium   Date Value Ref Range Status   03/01/2023 4.9 3.4 - 5.3 mmol/L Final   02/28/2022 4.5 3.4 - 5.3 mmol/L Final     Chloride   Date Value Ref Range Status   03/01/2023 107 98 - 107 mmol/L Final   02/28/2022 104 94 - 109 mmol/L Final     Carbon Dioxide (CO2)   Date Value Ref Range Status   03/01/2023 26 22 - 29 mmol/L Final   02/28/2022 27 20 - 32 mmol/L Final     Anion Gap   Date Value Ref Range Status   03/01/2023 8 7 - 15 mmol/L Final   02/28/2022 5 3 - 14 mmol/L Final     Glucose   Date Value Ref Range Status   03/01/2023 87 70 - 99 mg/dL Final   02/28/2022 77 70 - 99 mg/dL Final     Urea Nitrogen   Date Value Ref Range Status   03/01/2023 24.6 (H) 8.0 - 23.0 mg/dL Final   02/28/2022 27 7 - 30 mg/dL Final     Creatinine   Date Value Ref Range Status   03/01/2023 1.35 (H) 0.67 - 1.17 mg/dL Final     GFR Estimate   Date Value Ref Range Status   03/01/2023 55 (L) >60 mL/min/1.73m2 Final     Comment:     eGFR calculated using 2021 CKD-EPI equation.     Calcium   Date Value Ref Range Status   03/01/2023 9.5 8.8 - 10.2 mg/dL Final     Phosphorus   Date Value Ref Range Status   03/01/2023 3.1 2.5 - 4.5 mg/dL Final     Albumin   Date Value Ref Range Status   03/01/2023 4.3 3.5 - 5.2 g/dL Final   09/13/2021 4.0 3.4 - 5.0 g/dL Final       Imaging:  I reviewed imaging studies.   11/1/22:  1.  Increased 3.8 cm superior right renal cortical cyst compared to  9/13/2019; minimally complex and benign  appearing, likely Bosniak 2.  2.  Left renal cortical cysts, one of which is new and all of which  appear benign, likely Bosniak 1.  3.  No hydronephrosis or stone.    Assessment & Recommendations:   Problem list  # Elevated creatinine: likely high muscle mass or impaired creatinine excretion  # Normal kidney function based on cystatin C  # Hx of heavy NSAIDs use 20 years duration stopped since 2013  The patient has been noted to have creatinine elevation at least 12 years ago.  However, his creatinine has been stabilized and fluctuated between 1.2-1.3 for years.His creatinine started to increase to 1.47 on 9/26/2022 and further increased to 1.56 on 10/31/22 with EGFR 47.  Previously, there was a thought that the patient has creatinine elevation due to high muscle mass and high-protein diet.  At that time,  the patient was recently started on Fosamax for osteopenia in early September 2022 and since then creatinine started to increase.  His UA is essentially bland without protein or blood. UPCR has been undetectable. Cystatin C was checked and showed better result than Cr. Cr are in the 1.30 range and cystatin C 0.9-1. On 3/1/23 Cr 1.35 with eGFR 55 and cystatin C 1.0 with eGFR 74. Again, his UA was bland. I share with him that my suspicion for CKD is low as he has no other blood results that would suggest so and UA is completely bland. However, I discuss with him that the best way to estimate kidney function is likely eGFR CystatinC+Cr which likely result in somewhere in between. The best way to assure kidney function is measured GFR like iohexol (we have it here) or iothalamate. I do not think that we need this right now and he is also not keen on that as it involves a process of radiotracer injection and repeat measure in the urine and blood to calculate. However, if in the future, there is any question, we will be more than happy to arrange that.     - Follow up as needed  - Can follow creatinine and if Cr  increase may confirm with cystatin C  - May also consider measured GFR in the future   - Kidney healthy habit: stay well hydrated, avoid NSAIDs and other nephrotoxic agents  # Right kidney cyst  First noted in 2014. Repeat US showed  an increased 3.8 cm superior right renal cortical cyst compared to 9/13/2019; minimally complex and benign appearing, likely Bosniak 2.  - Refer to Urology    Follow-up  As needed    I spent  20 minutes on the date of the encounter doing chart review, history and exam, documentation and further activities as noted above. 20 minutes of this visit is dedicated to direct patient interaction via inperson    Jaye Powell MD on 03/02/2023.

## 2023-03-04 NOTE — TELEPHONE ENCOUNTER
Action 2023 JTv 3:30 PM    Action Taken CSS called patient. Patient gave VB OK to update medical records.      MEDICAL RECORDS REQUEST   [facility]  Urology Clinic  [address] [place] [id number]  PHONE: [phone]  Fax: [phone]        FUTURE VISIT INFORMATION                                                   Phu Jackson, : 1950 scheduled for future visit at [facility]    APPOINTMENT INFORMATION:    Date: 2023    Provider:  Berny Haynes MD    Reason for Visit/Diagnosis: Renal Cyst, minimally complex cyst at Right superior pole of the kidney, size 3.8 cm, Bosniak II    REFERRAL INFORMATION:    Referring provider:  Jaye Powell MD in  MEDICINE RENAL      RECORDS REQUESTED FOR VISIT                                                     NOTES  STATUS/DETAILS   OFFICE NOTE from referring provider  yes, 2023, 10/31/2022 -- Jaye Powell MD in  MEDICINE RENAL   MEDICATION LIST  yes   LABS     URINALYSIS (UA)  yes   images  yes, 2022 -- US RENAL  2019 -- US RENAL     PRE-VISIT CHECKLIST      Record collection complete yes   Appointment appropriately scheduled           (right time/right provider) Yes   Joint diagnostic appointment coordinated correctly          (ensure right order & amount of time) Yes   MyChart activation Yes   Questionnaire complete If no, please explain pending

## 2023-03-21 ENCOUNTER — PRE VISIT (OUTPATIENT)
Dept: UROLOGY | Facility: CLINIC | Age: 73
End: 2023-03-21
Payer: MEDICARE

## 2023-03-21 NOTE — CONFIDENTIAL NOTE
Reason for visit: consult     Relevant information: renal cysyts    Records/imaging/labs/orders: in epic    At Rooming: anastacia Woodruff  3/21/2023  12:57 PM

## 2023-03-29 ENCOUNTER — OFFICE VISIT (OUTPATIENT)
Dept: UROLOGY | Facility: CLINIC | Age: 73
End: 2023-03-29
Attending: UROLOGY
Payer: MEDICARE

## 2023-03-29 ENCOUNTER — PRE VISIT (OUTPATIENT)
Dept: UROLOGY | Facility: CLINIC | Age: 73
End: 2023-03-29

## 2023-03-29 VITALS
HEART RATE: 55 BPM | SYSTOLIC BLOOD PRESSURE: 120 MMHG | BODY MASS INDEX: 21.62 KG/M2 | DIASTOLIC BLOOD PRESSURE: 77 MMHG | HEIGHT: 70 IN | WEIGHT: 151 LBS

## 2023-03-29 DIAGNOSIS — N28.1 RENAL CYST: Primary | ICD-10-CM

## 2023-03-29 PROCEDURE — 99204 OFFICE O/P NEW MOD 45 MIN: CPT | Performed by: UROLOGY

## 2023-03-29 ASSESSMENT — PAIN SCALES - GENERAL: PAINLEVEL: NO PAIN (0)

## 2023-03-29 NOTE — LETTER
"3/29/2023       RE: Phu Jackson  [address]  [facility] [address]     Dear Colleague,    Thank you for referring your patient, Phu Jackson, to the [facility]. Please see a copy of my visit note below.    Subjective      REQUESTING PROVIDER  Dr.Nattawat Sherry MD    REASON FOR CONSULT  Renal mass    HISTORY OF PRESENT ILLNESS  Mr. Jackson is a pleasant 72 year old male who presents today for further evaluation and management of his recently discovered 3.8 cm superior right renal cortical cyst that appears to be Bosniak 2.  His past medical history is significant for BPH previously on Flomax now only on Proscar, depression, hyperlipidemia, and osteoporosis on Fosamax. Mr. Jackson had \"kidney cysts\" found on an ultrasound back in 2013 and was told that well nothing needs to be done at that time, they would continue to watch them and potentially send him to urology if they ever got bigger.      He got a repeat renal ultrasound in 2019 which showed only simple cysts in the kidneys with no evidence of solid components, the largest of which being 2.6 cm on the right.  Most recently he got a kidney ultrasound in November 2022 which showed growth of that cyst up to 3.8 cm, and also now showed evidence of internal septations (called as likely Bosniak 2).  The cysts on the left continue to look simple (Bosniak 1).  Given the growth and finding of internal septations, he was referred to our department for further discussion and evaluation.    From a symptom perspective, he denies any gross hematuria, flank pain, history of kidney infections, or history of urinary tract infections.  Denies any family history of urologic malignancy or any smoking history.    REVIEW OF SYSTEMS  Denies any new or worsening unexplained weight loss, fever, chills, or new or worsening fatigue    SOCIAL HISTORY  Denies significant smoking " history  Denies hx of strong chem exposure    FAMILY HISTORY  Denies any known family history of urologic malignancy    Objective      PHYSICAL EXAM  General: Alert, oriented, no acute distress    LABORATORY   Latest Reference Range & Units 03/01/23 12:11   Color Urine Colorless, Straw, Light Yellow, Yellow  Light Yellow   Appearance Urine Clear  Clear   Glucose Urine Negative mg/dL Negative   Bilirubin Urine Negative  Negative   Ketones Urine Negative mg/dL Negative   Specific Gravity Urine 1.003 - 1.035  1.009   pH Urine 5.0 - 7.0  6.5   Protein Albumin Urine Negative mg/dL Negative   Urobilinogen mg/dL Normal, 2.0 mg/dL Normal   Nitrite Urine Negative  Negative   Blood Urine Negative  Negative   Leukocyte Esterase Urine Negative  Negative   WBC Urine <=5 /HPF <1   RBC Urine <=2 /HPF 0     IMAGING  I personally reviewed and interpreted the below kidney ultrasound    Renal US  11/1/2022                                                     IMPRESSION:   1.  Increased 3.8 cm superior right renal cortical cyst compared to  9/13/2019; minimally complex and benign appearing, likely Bosniak 2.  2.  Left renal cortical cysts, one of which is new and all of which  appear benign, likely Bosniak 1.  3.  No hydronephrosis or stone.    Assessment & Plan   1. Right-sided Bosniak 2 renal cyst on ultrasound  2. Left-sided Bosniak 1 renal cysts on ultrasound    It was my pleasure to speak with Mr. Jackson in regards to his growing bilateral renal cysts, most recently 1 being shown as likely Bosniak 2.  After reviewing his lab work and imaging, I discussed with him the difference between cysts (both simple and complex) and masses.  After assuring him that the findings in his kidneys bilaterally are cysts, we then discussed the Bosniak scoring system for complex renal cysts, and that more recently they have seen these internal septations, bumping his Bosniak score for the right renal cyst up to at least a Bosniak 2.    In order to  better evaluate the right now Bosniak 2 renal cyst, we would recommend pursuing an MRI to have a final say on the true Bosniak classification and determine if we need to intervene or discontinue observation.    Mr. Jackson asked multiple insightful questions, and ultimately expressed understanding and agreement with the above discussion and plan, and all of his questions were answered to his satisfaction.    PLAN    Abdominal MRI with and without IV contrast    Written by:    Roque Paulino PA-C      Attestation:  I, Berny Haynes MD, saw this patient with the resident and agree with the PA's findings and plan of care. I personally reviewed the medications, vital signs, labs, and imaging. I have reviewed and edited the note above to reflect my findings. The physical exam and any procedures were performed by me and the pertinant details are outlined above.      45 total minutes spent on the date of the encounter including direct interaction with the patient, performing chart review, documentation and further activities as noted above    Berny Haynes MD   Department of Urology   [facility]

## 2023-03-29 NOTE — NURSING NOTE
"Chief Complaint   Patient presents with     Consult     Renal cyst       Blood pressure 120/77, pulse 55, height 1.778 m (5' 10\"), weight 68.5 kg (151 lb). Body mass index is 21.67 kg/m .    There is no problem list on file for this patient.      No Known Allergies    Current Outpatient Medications   Medication Sig Dispense Refill     alendronate (FOSAMAX) 70 MG tablet Take 70 mg by mouth once a week       atorvastatin (LIPITOR) 20 MG tablet Take 1 tablet by mouth daily       atropine 1 % ophthalmic solution  (Patient not taking: Reported on 11/9/2022)       Carboxymethylcellulose Sodium (EYE DROPS OP) Place 1 drop into both eyes 2 times daily Unsure of name       escitalopram (LEXAPRO) 20 MG tablet Take 1 tablet by mouth daily       finasteride (PROSCAR) 5 MG tablet Take 1 tablet by mouth daily       tamsulosin (FLOMAX) 0.4 MG capsule Take 1 capsule by mouth daily (Patient not taking: Reported on 11/9/2022)         Social History     Tobacco Use     Smoking status: Former     Types: Cigarettes     Smokeless tobacco: Never     Tobacco comments:     Quit 50 years ago **as of 11/09/22**   Vaping Use     Vaping Use: Never used   Substance Use Topics     Alcohol use: Not Currently     Comment: has not drank in 20 years **as of 11/09/22**     Drug use: Not Currently       Pritialmita Woodruff  3/29/2023  9:27 AM  "

## 2023-03-29 NOTE — PROGRESS NOTES
"Subjective     REQUESTING PROVIDER  Dr.Nattawat Sherry MD    REASON FOR CONSULT  Renal mass    HISTORY OF PRESENT ILLNESS  Mr. Jackson is a pleasant 72 year old male who presents today for further evaluation and management of his recently discovered 3.8 cm superior right renal cortical cyst that appears to be Bosniak 2.  His past medical history is significant for BPH previously on Flomax now only on Proscar, depression, hyperlipidemia, and osteoporosis on Fosamax. Mr. Jackson had \"kidney cysts\" found on an ultrasound back in 2013 and was told that well nothing needs to be done at that time, they would continue to watch them and potentially send him to urology if they ever got bigger.      He got a repeat renal ultrasound in 2019 which showed only simple cysts in the kidneys with no evidence of solid components, the largest of which being 2.6 cm on the right.  Most recently he got a kidney ultrasound in November 2022 which showed growth of that cyst up to 3.8 cm, and also now showed evidence of internal septations (called as likely Bosniak 2).  The cysts on the left continue to look simple (Bosniak 1).  Given the growth and finding of internal septations, he was referred to our department for further discussion and evaluation.    From a symptom perspective, he denies any gross hematuria, flank pain, history of kidney infections, or history of urinary tract infections.  Denies any family history of urologic malignancy or any smoking history.    REVIEW OF SYSTEMS  Denies any new or worsening unexplained weight loss, fever, chills, or new or worsening fatigue    SOCIAL HISTORY  Denies significant smoking history  Denies hx of strong chem exposure    FAMILY HISTORY  Denies any known family history of urologic malignancy    Objective     PHYSICAL EXAM  General: Alert, oriented, no acute distress    LABORATORY   Latest Reference Range & Units 03/01/23 12:11   Color Urine Colorless, Straw, Light Yellow, Yellow  Light Yellow "   Appearance Urine Clear  Clear   Glucose Urine Negative mg/dL Negative   Bilirubin Urine Negative  Negative   Ketones Urine Negative mg/dL Negative   Specific Gravity Urine 1.003 - 1.035  1.009   pH Urine 5.0 - 7.0  6.5   Protein Albumin Urine Negative mg/dL Negative   Urobilinogen mg/dL Normal, 2.0 mg/dL Normal   Nitrite Urine Negative  Negative   Blood Urine Negative  Negative   Leukocyte Esterase Urine Negative  Negative   WBC Urine <=5 /HPF <1   RBC Urine <=2 /HPF 0     IMAGING  I personally reviewed and interpreted the below kidney ultrasound    Renal US  11/1/2022                                                     IMPRESSION:   1.  Increased 3.8 cm superior right renal cortical cyst compared to  9/13/2019; minimally complex and benign appearing, likely Bosniak 2.  2.  Left renal cortical cysts, one of which is new and all of which  appear benign, likely Bosniak 1.  3.  No hydronephrosis or stone.    Assessment & Plan   1. Right-sided Bosniak 2 renal cyst on ultrasound  2. Left-sided Bosniak 1 renal cysts on ultrasound    It was my pleasure to speak with Mr. Jackson in regards to his growing bilateral renal cysts, most recently 1 being shown as likely Bosniak 2.  After reviewing his lab work and imaging, I discussed with him the difference between cysts (both simple and complex) and masses.  After assuring him that the findings in his kidneys bilaterally are cysts, we then discussed the Bosniak scoring system for complex renal cysts, and that more recently they have seen these internal septations, bumping his Bosniak score for the right renal cyst up to at least a Bosniak 2.    In order to better evaluate the right now Bosniak 2 renal cyst, we would recommend pursuing an MRI to have a final say on the true Bosniak classification and determine if we need to intervene or discontinue observation.    Mr. Jackson asked multiple insightful questions, and ultimately expressed understanding and agreement with the above  discussion and plan, and all of his questions were answered to his satisfaction.    PLAN    Abdominal MRI with and without IV contrast    Written by:    Roque Paulino PA-C      Attestation:  I, Berny Haynes MD, saw this patient with the resident and agree with the PA's findings and plan of care. I personally reviewed the medications, vital signs, labs, and imaging. I have reviewed and edited the note above to reflect my findings. The physical exam and any procedures were performed by me and the pertinant details are outlined above.      45 total minutes spent on the date of the encounter including direct interaction with the patient, performing chart review, documentation and further activities as noted above    Berny Haynes MD   Department of Urology   [facility]

## 2023-03-30 ENCOUNTER — ANCILLARY PROCEDURE (OUTPATIENT)
Dept: MRI IMAGING | Facility: CLINIC | Age: 73
End: 2023-03-30
Attending: STUDENT IN AN ORGANIZED HEALTH CARE EDUCATION/TRAINING PROGRAM
Payer: MEDICARE

## 2023-03-30 DIAGNOSIS — N28.1 RENAL CYST: ICD-10-CM

## 2023-03-30 PROCEDURE — A9585 GADOBUTROL INJECTION: HCPCS | Performed by: RADIOLOGY

## 2023-03-30 PROCEDURE — G1010 CDSM STANSON: HCPCS | Mod: GC | Performed by: RADIOLOGY

## 2023-03-30 PROCEDURE — 74183 MRI ABD W/O CNTR FLWD CNTR: CPT | Mod: MG | Performed by: RADIOLOGY

## 2023-03-30 RX ORDER — GADOBUTROL 604.72 MG/ML
7.5 INJECTION INTRAVENOUS ONCE
Status: COMPLETED | OUTPATIENT
Start: 2023-03-30 | End: 2023-03-30

## 2023-03-30 RX ADMIN — GADOBUTROL 7 ML: 604.72 INJECTION INTRAVENOUS at 15:19

## 2023-04-04 ENCOUNTER — TELEPHONE (OUTPATIENT)
Dept: UROLOGY | Facility: CLINIC | Age: 73
End: 2023-04-04
Payer: MEDICARE

## 2023-04-04 NOTE — TELEPHONE ENCOUNTER
Attempted to contact Mr. Jackson to go over MRI results and plan. Sent to [employer]. Left message saying I will call back soon.

## 2023-04-04 NOTE — TELEPHONE ENCOUNTER
Contacted Mr. Jackson to discuss his abdominal MRI. I was happy to let him know that the MRI confirmed our suspicions that this was a benign cyst that does not require any further follow-up given his history of ultrasounds prior to this.  We did discuss the radiologist recommendations that he be seen in 1 year with a repeat MRI, but he would prefer to follow-up as needed for other urologic complaints.  I believe this is reasonable. Mr. Jackson expressed understanding and agreement with the above discussion and plan.

## 2023-08-28 ENCOUNTER — LAB REQUISITION (OUTPATIENT)
Dept: LAB | Facility: CLINIC | Age: 73
End: 2023-08-28
Payer: MEDICARE

## 2023-08-28 DIAGNOSIS — Z13.220 ENCOUNTER FOR SCREENING FOR LIPOID DISORDERS: ICD-10-CM

## 2023-08-28 DIAGNOSIS — R79.89 OTHER SPECIFIED ABNORMAL FINDINGS OF BLOOD CHEMISTRY: ICD-10-CM

## 2023-08-28 LAB
ALBUMIN SERPL BCG-MCNC: 4.1 G/DL (ref 3.5–5.2)
ALP SERPL-CCNC: 43 U/L (ref 40–129)
ALT SERPL W P-5'-P-CCNC: 33 U/L (ref 0–70)
ANION GAP SERPL CALCULATED.3IONS-SCNC: 13 MMOL/L (ref 7–15)
AST SERPL W P-5'-P-CCNC: 42 U/L (ref 0–45)
BILIRUB SERPL-MCNC: 0.2 MG/DL
BUN SERPL-MCNC: 28.4 MG/DL (ref 8–23)
CALCIUM SERPL-MCNC: 9.2 MG/DL (ref 8.8–10.2)
CHLORIDE SERPL-SCNC: 104 MMOL/L (ref 98–107)
CHOLEST SERPL-MCNC: 133 MG/DL
CREAT SERPL-MCNC: 1.44 MG/DL (ref 0.67–1.17)
DEPRECATED HCO3 PLAS-SCNC: 23 MMOL/L (ref 22–29)
GFR SERPL CREATININE-BSD FRML MDRD: 51 ML/MIN/1.73M2
GLUCOSE SERPL-MCNC: 67 MG/DL (ref 70–99)
HDLC SERPL-MCNC: 48 MG/DL
LDLC SERPL CALC-MCNC: 64 MG/DL
NONHDLC SERPL-MCNC: 85 MG/DL
POTASSIUM SERPL-SCNC: 4.8 MMOL/L (ref 3.4–5.3)
PROT SERPL-MCNC: 6.6 G/DL (ref 6.4–8.3)
SODIUM SERPL-SCNC: 140 MMOL/L (ref 136–145)
TRIGL SERPL-MCNC: 107 MG/DL

## 2023-08-28 PROCEDURE — 80053 COMPREHEN METABOLIC PANEL: CPT | Mod: ORL | Performed by: INTERNAL MEDICINE

## 2023-08-28 PROCEDURE — 80061 LIPID PANEL: CPT | Mod: ORL | Performed by: INTERNAL MEDICINE

## 2024-02-25 ENCOUNTER — HEALTH MAINTENANCE LETTER (OUTPATIENT)
Age: 74
End: 2024-02-25

## 2024-04-08 ENCOUNTER — LAB REQUISITION (OUTPATIENT)
Dept: LAB | Facility: CLINIC | Age: 74
End: 2024-04-08
Payer: MEDICARE

## 2024-04-08 DIAGNOSIS — R79.89 OTHER SPECIFIED ABNORMAL FINDINGS OF BLOOD CHEMISTRY: ICD-10-CM

## 2024-04-08 DIAGNOSIS — Z11.59 ENCOUNTER FOR SCREENING FOR OTHER VIRAL DISEASES: ICD-10-CM

## 2024-04-08 LAB
ALBUMIN SERPL BCG-MCNC: 4.5 G/DL (ref 3.5–5.2)
ALP SERPL-CCNC: 38 U/L (ref 40–150)
ALT SERPL W P-5'-P-CCNC: 28 U/L (ref 0–70)
ANION GAP SERPL CALCULATED.3IONS-SCNC: 11 MMOL/L (ref 7–15)
AST SERPL W P-5'-P-CCNC: 42 U/L (ref 0–45)
BILIRUB SERPL-MCNC: 0.4 MG/DL
BUN SERPL-MCNC: 26.4 MG/DL (ref 8–23)
CALCIUM SERPL-MCNC: 9.3 MG/DL (ref 8.8–10.2)
CHLORIDE SERPL-SCNC: 101 MMOL/L (ref 98–107)
CREAT SERPL-MCNC: 1.4 MG/DL (ref 0.67–1.17)
DEPRECATED HCO3 PLAS-SCNC: 25 MMOL/L (ref 22–29)
EGFRCR SERPLBLD CKD-EPI 2021: 53 ML/MIN/1.73M2
GLUCOSE SERPL-MCNC: 85 MG/DL (ref 70–99)
HCV AB SERPL QL IA: NONREACTIVE
POTASSIUM SERPL-SCNC: 4.8 MMOL/L (ref 3.4–5.3)
PROT SERPL-MCNC: 7.3 G/DL (ref 6.4–8.3)
SODIUM SERPL-SCNC: 137 MMOL/L (ref 135–145)

## 2024-04-08 PROCEDURE — 86803 HEPATITIS C AB TEST: CPT | Mod: ORL | Performed by: INTERNAL MEDICINE

## 2024-04-08 PROCEDURE — 80053 COMPREHEN METABOLIC PANEL: CPT | Mod: ORL | Performed by: INTERNAL MEDICINE

## 2024-09-03 NOTE — TELEPHONE ENCOUNTER
Bed: 46  Expected date:   Expected time:   Means of arrival:   Comments:  GL ACT   Lab orders placed

## 2024-10-03 ENCOUNTER — LAB REQUISITION (OUTPATIENT)
Dept: LAB | Facility: CLINIC | Age: 74
End: 2024-10-03
Payer: MEDICARE

## 2024-10-03 DIAGNOSIS — R79.89 OTHER SPECIFIED ABNORMAL FINDINGS OF BLOOD CHEMISTRY: ICD-10-CM

## 2024-10-03 PROCEDURE — 80048 BASIC METABOLIC PNL TOTAL CA: CPT | Mod: ORL | Performed by: INTERNAL MEDICINE

## 2024-10-04 LAB
ANION GAP SERPL CALCULATED.3IONS-SCNC: 11 MMOL/L (ref 7–15)
BUN SERPL-MCNC: 30.5 MG/DL (ref 8–23)
CALCIUM SERPL-MCNC: 9.4 MG/DL (ref 8.8–10.4)
CHLORIDE SERPL-SCNC: 103 MMOL/L (ref 98–107)
CREAT SERPL-MCNC: 1.42 MG/DL (ref 0.67–1.17)
EGFRCR SERPLBLD CKD-EPI 2021: 52 ML/MIN/1.73M2
GLUCOSE SERPL-MCNC: 91 MG/DL (ref 70–99)
HCO3 SERPL-SCNC: 24 MMOL/L (ref 22–29)
POTASSIUM SERPL-SCNC: 4.8 MMOL/L (ref 3.4–5.3)
SODIUM SERPL-SCNC: 138 MMOL/L (ref 135–145)

## 2024-10-24 ENCOUNTER — HOSPITAL ENCOUNTER (OUTPATIENT)
Dept: BONE DENSITY | Facility: CLINIC | Age: 74
Discharge: HOME | End: 2024-10-24
Attending: INTERNAL MEDICINE | Admitting: INTERNAL MEDICINE
Payer: MEDICARE

## 2024-10-24 DIAGNOSIS — M81.0 OSTEOPOROSIS WITHOUT CURRENT PATHOLOGICAL FRACTURE, UNSPECIFIED OSTEOPOROSIS TYPE: ICD-10-CM

## 2024-10-24 PROCEDURE — 77080 DXA BONE DENSITY AXIAL: CPT

## 2025-03-10 ASSESSMENT — ACTIVITIES OF DAILY LIVING (ADL)
GO DOWN STAIRS: ACTIVITY IS MINIMALLY DIFFICULT
KNEEL ON THE FRONT OF YOUR KNEE: ACTIVITY IS SOMEWHAT DIFFICULT
GIVING WAY, BUCKLING OR SHIFTING OF KNEE: THE SYMPTOM AFFECTS MY ACTIVITY SLIGHTLY
SPORTS_TOTAL_ITEM_SCORE: 0
HOS_ADL_ITEM_SCORE_TOTAL: 46
HEAVY_WORK: MODERATE DIFFICULTY
ADL_HIGHEST_POTENTIAL_SCORE: 68
SITTING FOR 15 MINUTES: NO DIFFICULTY AT ALL
HOW_WOULD_YOU_RATE_THE_CURRENT_FUNCTION_OF_YOUR_KNEE_DURING_YOUR_USUAL_DAILY_ACTIVITIES_ON_A_SCALE_FROM_0_TO_100_WITH_100_BEING_YOUR_LEVEL_OF_KNEE_FUNCTION_PRIOR_TO_YOUR_INJURY_AND_0_BEING_THE_INABILITY_TO_PERFORM_ANY_OF_YOUR_USUAL_DAILY_ACTIVITIES?: 50
LANDING: EXTREME DIFFICULTY
GOING_UP_1_FLIGHT_OF_STAIRS: SLIGHT DIFFICULTY
HOW_WOULD_YOU_RATE_THE_OVERALL_FUNCTION_OF_YOUR_KNEE_DURING_YOUR_USUAL_DAILY_ACTIVITIES?: ABNORMAL
RISE FROM A CHAIR: ACTIVITY IS MINIMALLY DIFFICULT
LIGHT_TO_MODERATE_WORK: SLIGHT DIFFICULTY
WALK: ACTIVITY IS MINIMALLY DIFFICULT
WALKING_15_MINUTES_OR_GREATER: SLIGHT DIFFICULTY
PUTTING ON SOCKS AND SHOES: SLIGHT DIFFICULTY
SITTING_FOR_15_MINUTES: NO DIFFICULTY AT ALL
ADL_COUNT: 17
ROLLING OVER IN BED: NO DIFFICULTY AT ALL
GOING UP 1 FLIGHT OF STAIRS: SLIGHT DIFFICULTY
GETTING_INTO_AND_OUT_OF_A_BATHTUB: SLIGHT DIFFICULTY
SIT WITH YOUR KNEE BENT: ACTIVITY IS FAIRLY DIFFICULT
AS_A_RESULT_OF_YOUR_KNEE_INJURY,_HOW_WOULD_YOU_RATE_YOUR_CURRENT_LEVEL_OF_DAILY_ACTIVITY?: NEARLY NORMAL
SQUAT: ACTIVITY IS FAIRLY DIFFICULT
KNEE_ACTIVITY_OF_DAILY_LIVING_SCORE: 71.43
WEAKNESS: I HAVE THE SYMPTOM BUT IT DOES NOT AFFECT MY ACTIVITY
STAND: ACTIVITY IS MINIMALLY DIFFICULT
RAW_SCORE: 50
WEAKNESS: I HAVE THE SYMPTOM BUT IT DOES NOT AFFECT MY ACTIVITY
LIGHT_TO_MODERATE_WORK: SLIGHT DIFFICULTY
WALKING_INITIALLY: MODERATE DIFFICULTY
STANDING FOR 15 MINUTES: SLIGHT DIFFICULTY
GOING_DOWN_1_FLIGHT_OF_STAIRS: SLIGHT DIFFICULTY
WALK: ACTIVITY IS MINIMALLY DIFFICULT
ROLLING_OVER_IN_BED: NO DIFFICULTY AT ALL
HOW_WOULD_YOU_RATE_YOUR_CURRENT_LEVEL_OF_FUNCTION_DURING_YOUR_USUAL_ACTIVITIES_OF_DAILY_LIVING_FROM_0_TO_100_WITH_100_BEING_YOUR_LEVEL_OF_FUNCTION_PRIOR_TO_YOUR_HIP_PROBLEM_AND_0_BEING_THE_INABILITY_TO_PERFORM_ANY_OF_YOUR_USUAL_DAILY_ACTIVITIES?: 75
KNEE_ACTIVITY_OF_DAILY_LIVING_SUM: 50
SPORTS_SCORE(%): 0
PLEASE_INDICATE_YOR_PRIMARY_REASON_FOR_REFERRAL_TO_THERAPY:: HIP
PAIN: I DO NOT HAVE THE SYMPTOM
WALKING_INITIALLY: MODERATE DIFFICULTY
WALKING_APPROXIMATELY_10_MINUTES: SLIGHT DIFFICULTY
GO DOWN STAIRS: ACTIVITY IS MINIMALLY DIFFICULT
WALKING_DOWN_STEEP_HILLS: MODERATE DIFFICULTY
STAND: ACTIVITY IS MINIMALLY DIFFICULT
GETTING_INTO_AND_OUT_OF_AN_AVERAGE_CAR: SLIGHT DIFFICULTY
LIMPING: THE SYMPTOM AFFECTS MY ACTIVITY SLIGHTLY
RECREATIONAL ACTIVITIES: SLIGHT DIFFICULTY
GETTING_INTO_AND_OUT_OF_A_BATHTUB: SLIGHT DIFFICULTY
HOW_WOULD_YOU_RATE_THE_OVERALL_FUNCTION_OF_YOUR_KNEE_DURING_YOUR_USUAL_DAILY_ACTIVITIES?: ABNORMAL
KNEEL ON THE FRONT OF YOUR KNEE: ACTIVITY IS SOMEWHAT DIFFICULT
GO UP STAIRS: ACTIVITY IS MINIMALLY DIFFICULT
STARTING_AND_STOPPING_QUICKLY: MODERATE DIFFICULTY
LIMPING: THE SYMPTOM AFFECTS MY ACTIVITY SLIGHTLY
RISE FROM A CHAIR: ACTIVITY IS MINIMALLY DIFFICULT
JUMPING: EXTREME DIFFICULTY
STIFFNESS: THE SYMPTOM AFFECTS MY ACTIVITY SLIGHTLY
ABILITY_TO_PERFORM_ACTIVITY_WITH_YOUR_NORMAL_TECHNIQUE: MODERATE DIFFICULTY
WALKING_FOR_APPROXIMATELY_10_MINUTES: SLIGHT DIFFICULTY
PUTTING_ON_SOCKS_AND_SHOES: SLIGHT DIFFICULTY
STANDING_FOR_15_MINUTES: SLIGHT DIFFICULTY
LOW_IMPACT_ACTIVITIES_LIKE_FAST_WALKING: MODERATE DIFFICULTY
TWISTING/PIVOTING_ON_INVOLVED_LEG: MODERATE DIFFICULTY
SQUAT: ACTIVITY IS FAIRLY DIFFICULT
AS_A_RESULT_OF_YOUR_KNEE_INJURY,_HOW_WOULD_YOU_RATE_YOUR_CURRENT_LEVEL_OF_DAILY_ACTIVITY?: NEARLY NORMAL
CUTTING/LATERAL_MOVEMENTS: EXTREME DIFFICULTY
DEEP_SQUATTING: EXTREME DIFFICULTY
SPORTS_COUNT: 9
WALKING_UP_STEEP_HILLS: MODERATE DIFFICULTY
WALKING_DOWN_STEEP_HILLS: MODERATE DIFFICULTY
HOW_WOULD_YOU_RATE_YOUR_CURRENT_LEVEL_OF_FUNCTION_DURING_YOUR_SPORTS_RELATED_ACTIVITIES_FROM_0_TO_100_WITH_100_BEING_YOUR_LEVEL_OF_FUNCTION_PRIOR_TO_YOUR_HIP_PROBLEM_AND_0_BEING_THE_INABILITY_TO_PERFORM_ANY_OF_YOUR_USUAL_DAILY_ACTIVITIES?: 25
RECREATIONAL_ACTIVITIES: SLIGHT DIFFICULTY
GO UP STAIRS: ACTIVITY IS MINIMALLY DIFFICULT
DEEP SQUATTING: EXTREME DIFFICULTY
GOING DOWN 1 FLIGHT OF STAIRS: SLIGHT DIFFICULTY
HOS_ADL_SCORE(%): 67.65
WALKING_15_MINUTES_OR_GREATER: SLIGHT DIFFICULTY
SWELLING: I DO NOT HAVE THE SYMPTOM
STEPPING_UP_AND_DOWN_CURBS: NO DIFFICULTY AT ALL
SIT WITH YOUR KNEE BENT: ACTIVITY IS FAIRLY DIFFICULT
SWELLING: I DO NOT HAVE THE SYMPTOM
HEAVY_WORK: MODERATE DIFFICULTY
ADL_SCORE(%): 0
HOW_WOULD_YOU_RATE_THE_CURRENT_FUNCTION_OF_YOUR_KNEE_DURING_YOUR_USUAL_DAILY_ACTIVITIES_ON_A_SCALE_FROM_0_TO_100_WITH_100_BEING_YOUR_LEVEL_OF_KNEE_FUNCTION_PRIOR_TO_YOUR_INJURY_AND_0_BEING_THE_INABILITY_TO_PERFORM_ANY_OF_YOUR_USUAL_DAILY_ACTIVITIES?: 50
GIVING WAY, BUCKLING OR SHIFTING OF KNEE: THE SYMPTOM AFFECTS MY ACTIVITY SLIGHTLY
ABILITY_TO_PARTICIPATE_IN_YOUR_DESIRED_SPORT_AS_LONG_AS_YOU_WOULD_LIKE: EXTREME DIFFICULTY
STIFFNESS: THE SYMPTOM AFFECTS MY ACTIVITY SLIGHTLY
ADL_TOTAL_ITEM_SCORE: 0
PLEASE_INDICATE_YOR_PRIMARY_REASON_FOR_REFERRAL_TO_THERAPY:: KNEE
HOW_WOULD_YOU_RATE_YOUR_CURRENT_LEVEL_OF_FUNCTION_DURING_YOUR_USUAL_ACTIVITIES_OF_DAILY_LIVING_FROM_0_TO_100_WITH_100_BEING_YOUR_LEVEL_OF_FUNCTION_PRIOR_TO_YOUR_HIP_PROBLEM_AND_0_BEING_THE_INABILITY_TO_PERFORM_ANY_OF_YOUR_USUAL_DAILY_ACTIVITIES?: 75
HOS_ADL_HIGHEST_POTENTIAL_SCORE: 68
SPORTS_HIGHEST_POTENTIAL_SCORE: 36
STEPPING UP AND DOWN CURBS: NO DIFFICULTY AT ALL
TWISTING/PIVOTING ON INVOLVED LEG: MODERATE DIFFICULTY
HOW_WOULD_YOU_RATE_YOUR_CURRENT_LEVEL_OF_FUNCTION?: NEARLY NORMAL
GETTING INTO AND OUT OF AN AVERAGE CAR: SLIGHT DIFFICULTY
RUNNING_ONE_MILE: EXTREME DIFFICULTY
PAIN: I DO NOT HAVE THE SYMPTOM
WALKING_UP_STEEP_HILLS: MODERATE DIFFICULTY

## 2025-03-12 ENCOUNTER — THERAPY VISIT (OUTPATIENT)
Dept: PHYSICAL THERAPY | Facility: CLINIC | Age: 75
End: 2025-03-12
Attending: INTERNAL MEDICINE
Payer: MEDICARE

## 2025-03-12 DIAGNOSIS — S72.001D CLOSED FRACTURE OF RIGHT HIP WITH ROUTINE HEALING: Primary | ICD-10-CM

## 2025-03-12 PROCEDURE — 97110 THERAPEUTIC EXERCISES: CPT | Mod: GP | Performed by: PHYSICAL THERAPIST

## 2025-03-12 PROCEDURE — 97161 PT EVAL LOW COMPLEX 20 MIN: CPT | Mod: GP | Performed by: PHYSICAL THERAPIST

## 2025-03-12 NOTE — PROGRESS NOTES
PHYSICAL THERAPY EVALUATION  Type of Visit: Evaluation        Fall Risk Screen:  Fall screen completed by: PT  Have you fallen 2 or more times in the past year?: No  Have you fallen and had an injury in the past year?: No  Is patient a fall risk?: No    Subjective         Presenting condition or subjective complaint: Restore normal fundction to hip and knee  Date of onset: 12/17/24    Relevant medical history:     Dates & types of surgery:      Prior diagnostic imaging/testing results: MRI; X-ray     Prior therapy history for the same diagnosis, illness or injury: Yes Many years ago I had knee problems that were addressed by PT    Not having pain walking today but leg feels weak.  Having some knee pain.  Had a plate put in the hip.  Had slipped on a pool deck and fractured right hip.  Has an elliptical and is doing this for 30 minutes and lifting weight. Ambulating with a cane all the time.  [place] out [place] and walks 1/4 mile to car and back.No knee pain if uses the cane.    Prior Level of Function  Transfers: Independent  Ambulation: Independent  ADL: Independent  IADL:     Living Environment  Social support: With a significant other or spouse   Type of home: [facility]   Stairs to enter the home: Yes 10 Is there a railing: Yes     Ramp: No   Stairs inside the home: Yes 10 Is there a railing: Yes     Help at home: None  Equipment owned: Straight Cane     Employment: No    Hobbies/Interests:      Patient goals for therapy: Walk without a cane    Pain assessment: See objective evaluation for additional pain details     Objective   HIP EVALUATION  PAIN: Pain Location: knee  Pain Quality: Aching and Sharp  Pain Frequency: intermittent  Pain is Exacerbated By: walking without cane  Pain is Relieved By: use of cane  INTEGUMENTARY (edema, incisions):  lateral knee  POSTURE:   GAIT:   Weightbearing Status: PWB  Assistive Device(s): Cane (single end)  Gait Deviations: Antalgic  Base of support increased  Stance time  decreased    BALANCE/PROPRIOCEPTION: Single Leg Stance Eyes Open (seconds): 5  WEIGHTBEARING ALIGNMENT:   NON-WEIGHTBEARING ALIGNMENT:    ROM:   (Degrees) Left AROM Left PROM  Right AROM Right PROM   Hip Flexion  wnl  wnl   Hip Extension       Hip Abduction       Hip Adduction       Hip Internal Rotation       Hip External Rotation  wnl  20   Knee Flexion       Knee Extension       Lumbar Side glide     Lumbar Flexion    Lumbar Extension    Pain:   End feel:     PELVIC/SI SCREEN:   STRENGTH:   Pain: - none + mild ++ moderate +++ severe  Strength Scale: 0-5/5 Left Right   Hip Flexion 5 4+   Hip Extension 5- 4+   Hip Abduction 4+ 4   Hip Adduction     Hip Internal Rotation     Hip External Rotation     Knee Flexion     Knee Extension       LE FLEXIBILITY: Decreased hip flexors R  SPECIAL TESTS:   FUNCTIONAL TESTS: Double Leg Squat: Anterior knee translation, Knee valgus, Hip internal rotation, and Improper use of glutes/hips  Single Leg Squat: Knee valgus, Hip internal rotation, Increased trunk lean, and Improper use of glutes/hips  PALPATION:   JOINT MOBILITY: na    Assessment & Plan   CLINICAL IMPRESSIONS  Medical Diagnosis: losed fracture of right hip with routine healing    Treatment Diagnosis: losed fracture of right hip with routine healing   Impression/Assessment: Patient is a 74 year old male with right hip/knee complaints.  The following significant findings have been identified: Pain, Decreased ROM/flexibility, Decreased strength, Inflammation, Impaired gait, and Decreased activity tolerance. These impairments interfere with their ability to perform self care tasks, recreational activities, household mobility, and community mobility as compared to previous level of function.     Clinical Decision Making (Complexity):  Clinical Presentation: Stable/Uncomplicated  Clinical Presentation Rationale: based on medical and personal factors listed in PT evaluation  Clinical Decision Making (Complexity): Low  complexity    PLAN OF CARE  Treatment Interventions:  Interventions: Neuromuscular Re-education, Therapeutic Activity, Therapeutic Exercise    Long Term Goals     PT Goal 1  Goal Identifier: walking  Goal Description: able to walk for 30 mintues without knee pain without a cane  Rationale: to maximize safety and independence with performance of ADLs and functional tasks;to maximize safety and independence within the community  Goal Progress: currently walking with a cane on the left side and has pain in the right knee if does not use the cane  Target Date: 05/11/25      Frequency of Treatment: 2 times a month  Duration of Treatment: 2 months    Recommended Referrals to Other Professionals:   Education Assessment:   Learner/Method: Patient;Listening;Reading;Demonstration;Pictures/Video;No Barriers to Learning    Risks and benefits of evaluation/treatment have been explained.   Patient/Family/caregiver agrees with Plan of Care.     Evaluation Time:     PT Eval, Low Complexity Minutes (67949): 20       Signing Clinician: Nuria Nelson PT        [facility]                                                                                   OUTPATIENT PHYSICAL THERAPY      PLAN OF TREATMENT FOR OUTPATIENT REHABILITATION   Patient's Last Name, First Name, Phu Richardson    YOB: 1950   Provider's Name   [facility]   Medical Record No.  [id number]     Onset Date: 12/17/24  Start of Care Date:       Medical Diagnosis:  losed fracture of right hip with routine healing      PT Treatment Diagnosis:  losed fracture of right hip with routine healing Plan of Treatment  Frequency/Duration: 2 times a month/ 2 months    Certification date from 03/12/25 to 05/11/25         See note for plan of treatment details and functional goals     Nuria Nelson PT                         I CERTIFY THE NEED FOR THESE SERVICES FURNISHED UNDER        THIS PLAN OF  TREATMENT AND WHILE UNDER MY CARE .             Physician Signature               Date    X_____________________________________________________                  Referring Provider:  Ray Holt    Initial Assessment  See Epic Evaluation-

## 2025-03-15 ENCOUNTER — HEALTH MAINTENANCE LETTER (OUTPATIENT)
Age: 75
End: 2025-03-15

## 2025-03-26 ENCOUNTER — THERAPY VISIT (OUTPATIENT)
Age: 75
End: 2025-03-26
Payer: MEDICARE

## 2025-03-26 DIAGNOSIS — S72.001D CLOSED FRACTURE OF RIGHT HIP WITH ROUTINE HEALING: Primary | ICD-10-CM

## 2025-03-26 PROCEDURE — 97110 THERAPEUTIC EXERCISES: CPT | Mod: GP | Performed by: PHYSICAL THERAPIST

## 2025-04-14 ENCOUNTER — THERAPY VISIT (OUTPATIENT)
Age: 75
End: 2025-04-14
Payer: MEDICARE

## 2025-04-14 DIAGNOSIS — S72.001D CLOSED FRACTURE OF RIGHT HIP WITH ROUTINE HEALING: Primary | ICD-10-CM

## 2025-04-14 PROCEDURE — 97110 THERAPEUTIC EXERCISES: CPT | Mod: GP | Performed by: PHYSICAL THERAPIST

## 2025-05-29 ENCOUNTER — THERAPY VISIT (OUTPATIENT)
Age: 75
End: 2025-05-29
Payer: MEDICARE

## 2025-05-29 DIAGNOSIS — S72.001D CLOSED FRACTURE OF RIGHT HIP WITH ROUTINE HEALING: Primary | ICD-10-CM

## 2025-05-29 NOTE — PROGRESS NOTES
[facility]                                                                                   OUTPATIENT PHYSICAL THERAPY    PLAN OF TREATMENT FOR OUTPATIENT REHABILITATION   Patient's Last Name, First Name, Phu Richardson    YOB: 1950   Provider's Name   [facility]   Medical Record No.  [id number]     Onset Date: 12/17/24  Start of Care Date:       Medical Diagnosis:  closed fracture of right hip with routine healing      PT Treatment Diagnosis:  closed fracture of right hip with routine healing Plan of Treatment  Frequency/Duration: once a month/ 2 months    Certification date from 05/12/25 to 08/09/25         See note for plan of treatment details and functional goals     Nuria Nelson, PT                         I CERTIFY THE NEED FOR THESE SERVICES FURNISHED UNDER        THIS PLAN OF TREATMENT AND WHILE UNDER MY CARE .             Physician Signature               Date    X_____________________________________________________                  Referring Provider:  Ray Holt    Initial Assessment  See Epic Evaluation-              PLAN     05/29/25 0500   Appointment Info   Signing clinician's name / credentials Nuria Melchor,ATC   Total/Authorized Visits E+T(4)   Visits Used 4   Medical Diagnosis closed fracture of right hip with routine healing   PT Tx Diagnosis closed fracture of right hip with routine healing   Other pertinent information patient asked for 4-5 exercises only   Progress Note/Certification   Onset of illness/injury or Date of Surgery 12/17/24   Therapy Frequency once a month   Predicted Duration 2 months   Certification date from 05/12/25   Certification date to 08/09/25   Progress Note Due Date 08/09/25   Progress Note Completed Date 03/12/25   PT Goal 1   Goal Identifier walking   Goal Description able to walk for 30 minutes without knee pain without a cane   Rationale to maximize safety and  "independence with performance of ADLs and functional tasks;to maximize safety and independence within the community   Goal Progress walked 3 miles last Monday and had to use a cane by the end   Target Date 07/01/25   Subjective Report   [facility] Report [facility]  No pain with this.  However, can not go for a 3 mile walk without some fatigue and use of the cane by the end.  Is also trying to work on strength at the gym.   Objective Measures   Objective Measures Objective Measure 1;Objective Measure 2   Objective Measure 1   Objective Measure gait   Details not using a cane unless get fatigued   Objective Measure 2   Objective Measure step down   Details difficulty with control with 4 inch step down using right leg   Treatment Interventions (PT)   Interventions Therapeutic Procedure/Exercise;Neuromuscular Re-education   Therapeutic Procedure/Exercise   Therapeutic Procedures: strength, endurance, ROM, flexibility minutes (15442) 35   Therapeutic Procedures Ther Proc 2;Ther Proc 3;Ther Proc 4;Ther Proc 5   Ther Proc 1 [facility], swims 2 days a week, more fatigue vs. pain.   Ther Proc 1 - Details using hip ab/add machine, squatting with weights vs leg press   Ther Proc 2 hip abduction - standing   Ther Proc 2 - Details cues for positioning, holding onto counter, 10 x each leg   Ther Proc 3 single leg standing   Ther Proc 3 - Details 30\"x EO, holding onto counter/table   Ther Proc 4 supine and seated HS stretch   Ther Proc 4 - Details 20\"x3,B   Ther Proc 5 hip ext/and in standing   Ther Proc 5 - Details red TB x 10,   PTRx Ther Proc 1 Sit to Stand   PTRx Ther Proc 1 - Details #30 x 15   PTRx Ther Proc 2 Dumbbell Jamaican Dead Lift - Bilateral   PTRx Ther Proc 2 - Details #0 x 10   PTRx Ther Proc 3 Lateral Stepdown with Neutral Trunk Position   PTRx Ther Proc 3 - Details 8 reps with verbal cues for form   PTRx Ther Proc 4 Sit to Stand - Staggered Stance Left Forward "   PTRx Ther Proc 4 - Details 5 reps   PTRx Ther Proc 5 Clamshell with Theraband   PTRx Ther Proc 5 - Details doing hip abduction at the gym   PTRx Ther Proc 6 Prone Press Ups   PTRx Ther Proc 6 - Details no issues's with this, 5 reps   PTRx Ther Proc 7 Standing Gastroc Stretch   PTRx Ther Proc 7 - Details 30 sec   PTRx Ther Proc 8 Standing Hip Flexor Stretch   PTRx Ther Proc 8 - Details 30 sec   PTRx Ther Proc 9 Seated Hamstring Stretch   PTRx Ther Proc 9 - Details reviewed   Skilled Intervention verbal and tactile cues   Patient Response/Progress understanding expressed, questions answered   Neuromuscular Re-education   Neuromuscular re-ed of mvmt, balance, coord, kinesthetic sense, posture, proprioception minutes (81531) 2   PTRx Neuro Re-ed 1 Single Leg Stance - Balance Right Foot   PTRx Neuro Re-ed 1 - Details 2 minutes   Education   Learner/Method Patient;Listening;Reading;Demonstration;Pictures/Video;No Barriers to Learning   Plan   Home program see ptrx on phone and print out   Updates to plan of care added more single leg strength   Plan for next session progress hip strength/balance   Total Session Time   Timed Code Treatment Minutes 37   Total Treatment Time (sum of timed and untimed services) 37       Beginning/End Dates of Progress Note Reporting Period:  03/12/25 to 05/29/2025    Referring Provider:  Rya Holt

## 2025-06-04 ENCOUNTER — ANCILLARY PROCEDURE (OUTPATIENT)
Dept: GENERAL RADIOLOGY | Facility: CLINIC | Age: 75
End: 2025-06-04
Attending: INTERNAL MEDICINE
Payer: MEDICARE

## 2025-06-04 DIAGNOSIS — S72.001D CLOSED FRACTURE OF RIGHT HIP WITH ROUTINE HEALING, SUBSEQUENT ENCOUNTER: ICD-10-CM

## 2025-06-04 PROCEDURE — 73522 X-RAY EXAM HIPS BI 3-4 VIEWS: CPT | Performed by: RADIOLOGY

## 2025-06-23 ENCOUNTER — LAB REQUISITION (OUTPATIENT)
Dept: LAB | Facility: CLINIC | Age: 75
End: 2025-06-23
Payer: MEDICARE

## 2025-06-23 ENCOUNTER — TRANSCRIBE ORDERS (OUTPATIENT)
Dept: OTHER | Age: 75
End: 2025-06-23

## 2025-06-23 DIAGNOSIS — M21.70 ACQUIRED UNEQUAL LIMB LENGTH: Primary | ICD-10-CM

## 2025-06-23 DIAGNOSIS — R79.89 OTHER SPECIFIED ABNORMAL FINDINGS OF BLOOD CHEMISTRY: ICD-10-CM

## 2025-06-23 DIAGNOSIS — D69.6 THROMBOCYTOPENIA, UNSPECIFIED: ICD-10-CM

## 2025-06-23 DIAGNOSIS — Z12.5 ENCOUNTER FOR SCREENING FOR MALIGNANT NEOPLASM OF PROSTATE: ICD-10-CM

## 2025-06-23 LAB
ALBUMIN SERPL BCG-MCNC: 4.4 G/DL (ref 3.5–5.2)
ALP SERPL-CCNC: 37 U/L (ref 40–150)
ALT SERPL W P-5'-P-CCNC: 32 U/L (ref 0–70)
ANION GAP SERPL CALCULATED.3IONS-SCNC: 12 MMOL/L (ref 7–15)
AST SERPL W P-5'-P-CCNC: 44 U/L (ref 0–45)
BASOPHILS # BLD AUTO: 0 10E3/UL (ref 0–0.2)
BASOPHILS NFR BLD AUTO: 1 %
BILIRUB SERPL-MCNC: 0.4 MG/DL
BUN SERPL-MCNC: 29.9 MG/DL (ref 8–23)
CALCIUM SERPL-MCNC: 9.5 MG/DL (ref 8.8–10.4)
CHLORIDE SERPL-SCNC: 104 MMOL/L (ref 98–107)
CREAT SERPL-MCNC: 1.44 MG/DL (ref 0.67–1.17)
EGFRCR SERPLBLD CKD-EPI 2021: 51 ML/MIN/1.73M2
EOSINOPHIL # BLD AUTO: 0 10E3/UL (ref 0–0.7)
EOSINOPHIL NFR BLD AUTO: 1 %
ERYTHROCYTE [DISTWIDTH] IN BLOOD BY AUTOMATED COUNT: 13.5 % (ref 10–15)
GLUCOSE SERPL-MCNC: 85 MG/DL (ref 70–99)
HCO3 SERPL-SCNC: 23 MMOL/L (ref 22–29)
HCT VFR BLD AUTO: 38.7 % (ref 40–53)
HGB BLD-MCNC: 13 G/DL (ref 13.3–17.7)
IMM GRANULOCYTES # BLD: 0 10E3/UL
IMM GRANULOCYTES NFR BLD: 0 %
LYMPHOCYTES # BLD AUTO: 1.3 10E3/UL (ref 0.8–5.3)
LYMPHOCYTES NFR BLD AUTO: 32 %
MCH RBC QN AUTO: 32.4 PG (ref 26.5–33)
MCHC RBC AUTO-ENTMCNC: 33.6 G/DL (ref 31.5–36.5)
MCV RBC AUTO: 97 FL (ref 78–100)
MONOCYTES # BLD AUTO: 0.4 10E3/UL (ref 0–1.3)
MONOCYTES NFR BLD AUTO: 11 %
NEUTROPHILS # BLD AUTO: 2.2 10E3/UL (ref 1.6–8.3)
NEUTROPHILS NFR BLD AUTO: 55 %
NRBC # BLD AUTO: 0 10E3/UL
NRBC BLD AUTO-RTO: 0 /100
PLATELET # BLD AUTO: 143 10E3/UL (ref 150–450)
POTASSIUM SERPL-SCNC: 4.4 MMOL/L (ref 3.4–5.3)
PROT SERPL-MCNC: 7 G/DL (ref 6.4–8.3)
PSA SERPL DL<=0.01 NG/ML-MCNC: 0.08 NG/ML (ref 0–6.5)
RBC # BLD AUTO: 4.01 10E6/UL (ref 4.4–5.9)
SODIUM SERPL-SCNC: 139 MMOL/L (ref 135–145)
WBC # BLD AUTO: 4 10E3/UL (ref 4–11)

## 2025-06-23 PROCEDURE — 85025 COMPLETE CBC W/AUTO DIFF WBC: CPT | Mod: ORL | Performed by: INTERNAL MEDICINE

## 2025-06-23 PROCEDURE — 80053 COMPREHEN METABOLIC PANEL: CPT | Mod: ORL | Performed by: INTERNAL MEDICINE

## 2025-06-23 PROCEDURE — G0103 PSA SCREENING: HCPCS | Mod: ORL | Performed by: INTERNAL MEDICINE

## 2025-07-10 ASSESSMENT — ACTIVITIES OF DAILY LIVING (ADL)
DEEP SQUATTING: EXTREME DIFFICULTY
STEPPING_UP_AND_DOWN_CURBS: NO DIFFICULTY AT ALL
WALKING_INITIALLY: NO DIFFICULTY AT ALL
STARTING_AND_STOPPING_QUICKLY: MODERATE DIFFICULTY
HOS_ADL_HIGHEST_POTENTIAL_SCORE: 68
HEAVY_WORK: MODERATE DIFFICULTY
SITTING FOR 15 MINUTES: NO DIFFICULTY AT ALL
HOS_ADL_ITEM_SCORE_TOTAL: 54
PUTTING_ON_SOCKS_AND_SHOES: SLIGHT DIFFICULTY
GETTING_INTO_AND_OUT_OF_AN_AVERAGE_CAR: NO DIFFICULTY AT ALL
SPORTS_TOTAL_ITEM_SCORE: 0
SPORTS_COUNT: 9
SITTING_FOR_15_MINUTES: NO DIFFICULTY AT ALL
GETTING_INTO_AND_OUT_OF_A_BATHTUB: NO DIFFICULTY AT ALL
HOW_WOULD_YOU_RATE_YOUR_CURRENT_LEVEL_OF_FUNCTION_DURING_YOUR_SPORTS_RELATED_ACTIVITIES_FROM_0_TO_100_WITH_100_BEING_YOUR_LEVEL_OF_FUNCTION_PRIOR_TO_YOUR_HIP_PROBLEM_AND_0_BEING_THE_INABILITY_TO_PERFORM_ANY_OF_YOUR_USUAL_DAILY_ACTIVITIES?: 85
GOING DOWN 1 FLIGHT OF STAIRS: SLIGHT DIFFICULTY
GETTING INTO AND OUT OF AN AVERAGE CAR: NO DIFFICULTY AT ALL
TWISTING/PIVOTING ON INVOLVED LEG: NO DIFFICULTY AT ALL
STANDING FOR 15 MINUTES: NO DIFFICULTY AT ALL
ROLLING OVER IN BED: NO DIFFICULTY AT ALL
WALKING_15_MINUTES_OR_GREATER: MODERATE DIFFICULTY
GETTING_INTO_AND_OUT_OF_A_BATHTUB: NO DIFFICULTY AT ALL
STEPPING UP AND DOWN CURBS: NO DIFFICULTY AT ALL
WALKING_15_MINUTES_OR_GREATER: MODERATE DIFFICULTY
LIGHT_TO_MODERATE_WORK: SLIGHT DIFFICULTY
ABILITY_TO_PARTICIPATE_IN_YOUR_DESIRED_SPORT_AS_LONG_AS_YOU_WOULD_LIKE: EXTREME DIFFICULTY
STANDING_FOR_15_MINUTES: NO DIFFICULTY AT ALL
SWINGING_OBJECTS_LIKE_A_GOLF_CLUB: NO DIFFICULTY AT ALL
WALKING_FOR_APPROXIMATELY_10_MINUTES: SLIGHT DIFFICULTY
TWISTING/PIVOTING_ON_INVOLVED_LEG: NO DIFFICULTY AT ALL
GOING_UP_1_FLIGHT_OF_STAIRS: SLIGHT DIFFICULTY
SPORTS_SCORE(%): 0
PUTTING ON SOCKS AND SHOES: SLIGHT DIFFICULTY
ADL_HIGHEST_POTENTIAL_SCORE: 68
WALKING_APPROXIMATELY_10_MINUTES: SLIGHT DIFFICULTY
SPORTS_HIGHEST_POTENTIAL_SCORE: 36
LIGHT_TO_MODERATE_WORK: SLIGHT DIFFICULTY
WALKING_UP_STEEP_HILLS: SLIGHT DIFFICULTY
HEAVY_WORK: MODERATE DIFFICULTY
HOS_ADL_SCORE(%): 79.41
GOING_DOWN_1_FLIGHT_OF_STAIRS: SLIGHT DIFFICULTY
RECREATIONAL ACTIVITIES: SLIGHT DIFFICULTY
WALKING_INITIALLY: NO DIFFICULTY AT ALL
CUTTING/LATERAL_MOVEMENTS: MODERATE DIFFICULTY
HOW_WOULD_YOU_RATE_YOUR_CURRENT_LEVEL_OF_FUNCTION?: NEARLY NORMAL
JUMPING: EXTREME DIFFICULTY
ADL_TOTAL_ITEM_SCORE: 0
RUNNING_ONE_MILE: UNABLE TO DO
DEEP_SQUATTING: EXTREME DIFFICULTY
WALKING_UP_STEEP_HILLS: SLIGHT DIFFICULTY
GOING UP 1 FLIGHT OF STAIRS: SLIGHT DIFFICULTY
WALKING_DOWN_STEEP_HILLS: SLIGHT DIFFICULTY
RECREATIONAL_ACTIVITIES: SLIGHT DIFFICULTY
ADL_COUNT: 17
LANDING: SLIGHT DIFFICULTY
LOW_IMPACT_ACTIVITIES_LIKE_FAST_WALKING: MODERATE DIFFICULTY
WALKING_DOWN_STEEP_HILLS: SLIGHT DIFFICULTY
PLEASE_INDICATE_YOR_PRIMARY_REASON_FOR_REFERRAL_TO_THERAPY:: HIP
ADL_SCORE(%): 0
ABILITY_TO_PERFORM_ACTIVITY_WITH_YOUR_NORMAL_TECHNIQUE: SLIGHT DIFFICULTY
ROLLING_OVER_IN_BED: NO DIFFICULTY AT ALL

## 2025-07-15 ENCOUNTER — THERAPY VISIT (OUTPATIENT)
Dept: PHYSICAL THERAPY | Facility: CLINIC | Age: 75
End: 2025-07-15
Attending: INTERNAL MEDICINE
Payer: MEDICARE

## 2025-07-15 DIAGNOSIS — M21.70 ACQUIRED UNEQUAL LIMB LENGTH: ICD-10-CM

## 2025-07-15 PROCEDURE — 97161 PT EVAL LOW COMPLEX 20 MIN: CPT | Mod: GP

## 2025-07-15 PROCEDURE — 97530 THERAPEUTIC ACTIVITIES: CPT | Mod: GP

## 2025-07-15 NOTE — PROGRESS NOTES
PHYSICAL THERAPY EVALUATION  Type of Visit: Evaluation       Fall Risk Screen:  Have you fallen 2 or more times in the past year?: Yes  Have you fallen and had an injury in the past year?: No  Is patient receiving Physical Therapy Services?: Yes  Fall screen comments: currently in PT hip strengthening    Subjective   Pt is a 75 year old male presenting to PT with chief complaint of acquired leg length discrepancy following right hip fracture and then surgery, s/p R hip ORIF on 12/20/24. He notes his pain has resolved and he is doing his HEP he got for this. His primary concern at this time is to be evaluated for his leg length discrepancy. He has been told hid right leg is now shorter than his left leg. He would like to ensure that he does not develop back or knee issues from this. He is seeking recommendation on a heel lift. He denies NT into his feet.         Presenting condition or subjective complaint: The Xray of my hip progress indicated one leg slightly longer  Date of onset: 06/23/25 (referral)    Relevant medical history: Osteoarthritis; Osteoporosis No past medical history on file.    Dates & types of surgery: Hip Srgery 12- No past surgical history on file.  S/p R hip ORIF on 12/20/24      Prior diagnostic imaging/testing results:       Prior therapy history for the same diagnosis, illness or injury: No      Prior Level of Function  Transfers: Independent  Ambulation: Independent  ADL: Independent  IADL: Driving, Finances, Housekeeping, Meal preparation    Living Environment  Social support: With a significant other or spouse   Type of home: [facility]   Stairs to enter the home: Yes 1 Is there a railing: Yes     Ramp: No   Stairs inside the home: Yes 3 Is there a railing: Yes     Help at home: None  Equipment owned: Straight Cane     Employment: No  , retired   Hobbies/Interests: reading, crossword    Patient goals for therapy: Walk Normally, 30 minutes of elliptical each day followed by 30  "minutes swimming or resistance training each day    Pain assessment: Location: NA/Ratin/10     Objective   HIP EVALUATION  OBSERVATION:    INTEGUMENTARY (edema, incisions): incision is well healed.     GAIT:   Weightbearing Status: WBAT  Assistive Device(s): none  Gait Deviations: Pt ambulates with antalgic gait pattern, guarding R LE. mild trendelenburg noted. PT also notes that heel contact has increased force with R compared to L LE.     ROM:   WFL B    LEG LENGTH: (measured from inferior aspect of ASIS to superior aspect of medial malleoli)  Right LE: 36.25\"  Left LE: 36.5\"    STRENGTH:   Hip flexion: 4+/5 B  Hip extension: 4+/5 B  Hip abduction: 4+/5 B  Hip internal rotation: 5/5 B   Hip external rotation: 5/5 B  Knee flexion: 5/5 B  Knee extension: 5/5 B    NEUROLOGIC ASSESSMENT:  Myotomes: WNL  Dermatomes: WNL  Reflexes: WNL      FUNCTIONAL STRENGTH:   Squat: pt can no knee valgus and good eccentric control with cueing      PALPATION: no palpation tenderness noted    JOINT MOBILITY: WFL      Assessment & Plan   CLINICAL IMPRESSIONS  Medical Diagnosis: Acquired unequal limb length    Treatment Diagnosis: Right leg length discrepency (shorter)   Impression/Assessment: Pt is a 75 year old male presenting to PT with chief complaint of acquired leg length discrepancy following right hip fracture and then surgery, s/p R hip ORIF on 24.. The patient presents with .25\" leg length discrepancy, R shorter than L which inhibits their ability to ambulate without notable gait impairment. The patient tolerated the session well. Education provided as noted. 1X eval and treat is the plan      Clinical Decision Making (Complexity):  Clinical Presentation: Stable/Uncomplicated  Clinical Presentation Rationale: based on medical and personal factors listed in PT evaluation  Clinical Decision Making (Complexity): Low complexity    PLAN OF CARE  EVAL ONLY      Long Term Goals     PT Goal 1  Goal Identifier: pt will " verbalize understanding of education provided  Goal Description: pt educated on heel lift, verbalizes understanding  Rationale: to maximize safety and independence with performance of ADLs and functional tasks  Goal Progress: MET  Target Date: 07/15/25  Date Met: 07/15/25      Frequency of Treatment: 1X per week decreasing to 1X every other week as needed  Duration of Treatment: 2-3 months    Education Assessment:   Learner/Method: Patient;No Barriers to Learning  Education Comments: pt verbalizes understanding of educatoin provided    Risks and benefits of evaluation/treatment have been explained.   Patient/Family/caregiver agrees with Plan of Care.     Evaluation Time:     PT Eval, Low Complexity Minutes (76334): 15  Evaluation Only     Signing Clinician: Yarelis Singh, PT        [facility]                                                                                   OUTPATIENT PHYSICAL THERAPY      PLAN OF TREATMENT FOR OUTPATIENT REHABILITATION   Patient's Last Name, First Name, Phu Richardson    YOB: 1950   Provider's Name   [facility]   Medical Record No.  [id number]     Onset Date: 06/23/25 (referral)  Start of Care Date: 07/15/25     Medical Diagnosis:  Acquired unequal limb length      PT Treatment Diagnosis:  Right leg length discrepency (shorter) Plan of Treatment  Frequency/Duration: 1X per week decreasing to 1X every other week as needed/ 2-3 months    Certification date from 07/15/25 to 07/15/25         See note for plan of treatment details and functional goals     Yarelis Singh, PT                         I CERTIFY THE NEED FOR THESE SERVICES FURNISHED UNDER        THIS PLAN OF TREATMENT AND WHILE UNDER MY CARE .             Physician Signature               Date    X_____________________________________________________                  Referring Provider:  Ray Holt    Initial Assessment  See Epic  Evaluation- Start of Care Date: 07/15/25

## 2025-07-22 ENCOUNTER — MEDICAL CORRESPONDENCE (OUTPATIENT)
Dept: HEALTH INFORMATION MANAGEMENT | Facility: CLINIC | Age: 75
End: 2025-07-22
Payer: MEDICARE

## 2025-07-23 ENCOUNTER — TRANSCRIBE ORDERS (OUTPATIENT)
Dept: OTHER | Age: 75
End: 2025-07-23

## 2025-07-23 DIAGNOSIS — M21.70 ACQUIRED UNEQUAL LIMB LENGTH: Primary | ICD-10-CM

## 2025-07-24 ENCOUNTER — PATIENT OUTREACH (OUTPATIENT)
Dept: CARE COORDINATION | Facility: CLINIC | Age: 75
End: 2025-07-24
Payer: MEDICARE

## 2025-07-28 ENCOUNTER — PATIENT OUTREACH (OUTPATIENT)
Dept: CARE COORDINATION | Facility: CLINIC | Age: 75
End: 2025-07-28
Payer: MEDICARE